# Patient Record
Sex: MALE | Race: BLACK OR AFRICAN AMERICAN | Employment: FULL TIME | URBAN - METROPOLITAN AREA
[De-identification: names, ages, dates, MRNs, and addresses within clinical notes are randomized per-mention and may not be internally consistent; named-entity substitution may affect disease eponyms.]

---

## 2017-02-13 ENCOUNTER — HOSPITAL ENCOUNTER (OUTPATIENT)
Dept: LAB | Age: 32
Discharge: HOME | End: 2017-02-13
Payer: MEDICARE

## 2017-02-13 ENCOUNTER — OFFICE VISIT (OUTPATIENT)
Dept: INTERNAL MEDICINE CLINIC | Age: 32
End: 2017-02-13

## 2017-02-13 VITALS
HEART RATE: 70 BPM | BODY MASS INDEX: 24.84 KG/M2 | TEMPERATURE: 97.3 F | DIASTOLIC BLOOD PRESSURE: 75 MMHG | RESPIRATION RATE: 16 BRPM | WEIGHT: 187.4 LBS | HEIGHT: 73 IN | SYSTOLIC BLOOD PRESSURE: 131 MMHG

## 2017-02-13 DIAGNOSIS — J45.909 UNCOMPLICATED ASTHMA, UNSPECIFIED ASTHMA SEVERITY: ICD-10-CM

## 2017-02-13 DIAGNOSIS — F90.9 ATTENTION DEFICIT HYPERACTIVITY DISORDER (ADHD), UNSPECIFIED ADHD TYPE: ICD-10-CM

## 2017-02-13 DIAGNOSIS — Z20.2 STD EXPOSURE: Primary | ICD-10-CM

## 2017-02-13 DIAGNOSIS — Z20.2 EXPOSURE TO CHLAMYDIA: ICD-10-CM

## 2017-02-13 DIAGNOSIS — A56.2 CHLAMYDIAL INFECTION OF GENITOURINARY TRACT: ICD-10-CM

## 2017-02-13 DIAGNOSIS — F32.1 MODERATE SINGLE CURRENT EPISODE OF MAJOR DEPRESSIVE DISORDER (HCC): ICD-10-CM

## 2017-02-13 DIAGNOSIS — F39 MOOD DISORDER (HCC): ICD-10-CM

## 2017-02-13 DIAGNOSIS — J30.9 ALLERGIC RHINITIS, UNSPECIFIED ALLERGIC RHINITIS TRIGGER, UNSPECIFIED RHINITIS SEASONALITY: ICD-10-CM

## 2017-02-13 DIAGNOSIS — Z11.3 SCREEN FOR STD (SEXUALLY TRANSMITTED DISEASE): ICD-10-CM

## 2017-02-13 DIAGNOSIS — F31.81 BIPOLAR 2 DISORDER (HCC): ICD-10-CM

## 2017-02-13 DIAGNOSIS — F41.9 ANXIETY: ICD-10-CM

## 2017-02-13 PROCEDURE — 87591 N.GONORRHOEAE DNA AMP PROB: CPT

## 2017-02-13 RX ORDER — QUETIAPINE FUMARATE 50 MG/1
50 TABLET, FILM COATED ORAL
Qty: 30 TAB | Refills: 2 | Status: SHIPPED | OUTPATIENT
Start: 2017-02-13 | End: 2017-02-15 | Stop reason: SDDI

## 2017-02-13 RX ORDER — BUPROPION HYDROCHLORIDE 300 MG/1
300 TABLET ORAL
Qty: 30 TAB | Refills: 2 | Status: SHIPPED | OUTPATIENT
Start: 2017-02-13 | End: 2017-02-15 | Stop reason: SDUPTHER

## 2017-02-13 RX ORDER — AZITHROMYCIN 500 MG/1
1000 TABLET, FILM COATED ORAL ONCE
Qty: 2 TAB | Refills: 0 | Status: SHIPPED | OUTPATIENT
Start: 2017-02-13 | End: 2017-02-13

## 2017-02-13 RX ORDER — DEXTROAMPHETAMINE SACCHARATE, AMPHETAMINE ASPARTATE MONOHYDRATE, DEXTROAMPHETAMINE SULFATE AND AMPHETAMINE SULFATE 7.5; 7.5; 7.5; 7.5 MG/1; MG/1; MG/1; MG/1
30 CAPSULE, EXTENDED RELEASE ORAL DAILY
Qty: 30 CAP | Refills: 0 | Status: SHIPPED | OUTPATIENT
Start: 2017-02-13 | End: 2017-02-15

## 2017-02-13 RX ORDER — DEXTROAMPHETAMINE SACCHARATE, AMPHETAMINE ASPARTATE MONOHYDRATE, DEXTROAMPHETAMINE SULFATE AND AMPHETAMINE SULFATE 7.5; 7.5; 7.5; 7.5 MG/1; MG/1; MG/1; MG/1
30 CAPSULE, EXTENDED RELEASE ORAL DAILY
Qty: 30 CAP | Refills: 0 | Status: SHIPPED | OUTPATIENT
Start: 2017-02-13 | End: 2017-02-15 | Stop reason: ALTCHOICE

## 2017-02-13 RX ORDER — DEXTROAMPHETAMINE SACCHARATE, AMPHETAMINE ASPARTATE MONOHYDRATE, DEXTROAMPHETAMINE SULFATE AND AMPHETAMINE SULFATE 7.5; 7.5; 7.5; 7.5 MG/1; MG/1; MG/1; MG/1
30 CAPSULE, EXTENDED RELEASE ORAL DAILY
Qty: 30 CAP | Refills: 0 | Status: SHIPPED | OUTPATIENT
Start: 2017-02-13 | End: 2017-02-15 | Stop reason: SDUPTHER

## 2017-02-13 RX ORDER — ALBUTEROL SULFATE 90 UG/1
2 AEROSOL, METERED RESPIRATORY (INHALATION)
Qty: 1 INHALER | Refills: 2 | Status: SHIPPED | OUTPATIENT
Start: 2017-02-13 | End: 2018-03-16 | Stop reason: SDUPTHER

## 2017-02-13 RX ORDER — BUSPIRONE HYDROCHLORIDE 5 MG/1
5 TABLET ORAL
Qty: 90 TAB | Refills: 2 | Status: SHIPPED | OUTPATIENT
Start: 2017-02-13 | End: 2017-02-15 | Stop reason: SDDI

## 2017-02-13 NOTE — PROGRESS NOTES
RM 14    Chief Complaint   Patient presents with    Behavioral Problem     ADHD , anxiety follow up       1. Have you been to the ER, urgent care clinic since your last visit? Hospitalized since your last visit? No    2. Have you seen or consulted any other health care providers outside of the [address] [place] [address] since your last visit? Include any pap smears or colon screening.  No    Health Maintenance Due   Topic Date Due    Pneumococcal 19-64 Medium Risk (1 of 1 - PPSV23) 04/03/2004    DTaP/Tdap/Td series (1 - Tdap) 04/03/2006    INFLUENZA AGE 9 TO ADULT  08/01/2016     Pt did not get flu vaccine this season   How to stop smoking information sent to pt AVS

## 2017-02-13 NOTE — MR AVS SNAPSHOT
Visit Information Date & Time Provider Department Dept. Phone Encounter #  
 2/13/2017  9:45 AM Kenan Vidales MD [facility] [phone] [id number] Follow-up Instructions Return in about 4 months (around 6/13/2017), or if symptoms worsen or fail to improve, for ADHD, asthma. Upcoming Health Maintenance Date Due DTaP/Tdap/Td series (2 - Td) 2/13/2027 Allergies as of 2/13/2017  Review Complete On: 2/13/2017 By: Kenan Vidales MD  
 No Known Allergies Current Immunizations  Reviewed on 2/13/2017 No immunizations on file. Reviewed by Kenan Vidales MD on 2/13/2017 at 10:48 AM  
 Reviewed by Kenan Vidales MD on 2/13/2017 at 10:48 AM  
You Were Diagnosed With   
  
 Codes Comments STD exposure    -  Primary ICD-10-CM: Z20.2 ICD-9-CM: V01.6 Exposure to chlamydia     ICD-10-CM: Z20.2 ICD-9-CM: V01.6 Chlamydial infection of genitourinary tract     ICD-10-CM: A56.2 ICD-9-CM: 099.55, 079.98 Screen for STD (sexually transmitted disease)     ICD-10-CM: Z11.3 ICD-9-CM: V74.5 Bipolar 2 disorder (HCC)     ICD-10-CM: F31.81 
ICD-9-CM: 296.89 Uncomplicated asthma, unspecified asthma severity     ICD-10-CM: J45.909 ICD-9-CM: 493.90 Allergic rhinitis, unspecified allergic rhinitis trigger, unspecified rhinitis seasonality     ICD-10-CM: J30.9 ICD-9-CM: 477.9 Attention deficit hyperactivity disorder (ADHD), unspecified ADHD type     ICD-10-CM: F90.9 ICD-9-CM: 314.01 Mood disorder ([address]     ICD-10-CM: F39 
ICD-9-CM: 296.90 Moderate single current episode of major depressive disorder (HCC)     ICD-10-CM: F32.1 ICD-9-CM: 296.22 Anxiety     ICD-10-CM: F41.9 ICD-9-CM: 300.00 Vitals BP Pulse Temp Resp Height(growth percentile) Weight(growth percentile) 131/75 70 97.3 °F (36.3 °C) (Oral) 16 6' 1\" (1.854 m) 187 lb 6.4 oz (85 kg) BMI Smoking Status 24.72 kg/m2 Current Every Day Smoker BMI and BSA Data Body Mass Index Body Surface Area 24.72 kg/m 2 2.09 m 2 Preferred Pharmacy Pharmacy Name Phone [id number] [phone] Your Updated Medication List  
  
   
This list is accurate as of: 2/13/17 10:51 AM.  Always use your most recent med list.  
  
  
  
  
 albuterol 90 mcg/actuation inhaler Commonly known as:  PROVENTIL HFA, VENTOLIN HFA, PROAIR HFA Take 2 Puffs by inhalation every four (4) hours as needed for Wheezing. Use with spacer. * amphetamine-dextroamphetamine XR 30 mg XR capsule Commonly known as:  ADDERALL XR Take 1 Cap (30 mg total) by mouth daily. Max Daily Amount: 30 mg  
  
 * amphetamine-dextroamphetamine XR 30 mg XR capsule Commonly known as:  ADDERALL XR Take 1 Cap (30 mg total) by mouth daily. Fill on or after 4/13/17 Max Daily Amount: 30 mg  
  
 * amphetamine-dextroamphetamine XR 30 mg XR capsule Commonly known as:  ADDERALL XR Take 1 Cap (30 mg total) by mouth daily. Fill on or after 3/13/17 Max Daily Amount: 30 mg  
  
 azithromycin 500 mg Tab Commonly known as:  Carleen Bunkers Take 2 Tabs by mouth once for 1 dose. beclomethasone 80 mcg/actuation inhaler Commonly known as:  QVAR Take 2 Puffs by inhalation daily. buPROPion  mg XL tablet Commonly known as:  Sauceda  Take 1 Tab by mouth every morning. busPIRone 5 mg tablet Commonly known as:  BUSPAR Take 1 Tab by mouth three (3) times daily (with meals). hydrOXYzine HCl 25 mg tablet Commonly known as:  ATARAX Take 1 Tab by mouth three (3) times daily as needed for Anxiety. QUEtiapine 50 mg tablet Commonly known as:  SEROquel Take 1 Tab by mouth nightly. * Notice:   This list has 3 medication(s) that are the same as other medications prescribed for you. Read the directions carefully, and ask your doctor or other care provider to review them with you. Prescriptions Printed Refills  
 amphetamine-dextroamphetamine XR (ADDERALL XR) 30 mg XR capsule 0 Sig: Take 1 Cap (30 mg total) by mouth daily. Max Daily Amount: 30 mg  
 Class: Print Route: Oral  
 amphetamine-dextroamphetamine XR (ADDERALL XR) 30 mg XR capsule 0 Sig: Take 1 Cap (30 mg total) by mouth daily. Fill on or after 4/13/17 Max Daily Amount: 30 mg  
 Class: Print Route: Oral  
 amphetamine-dextroamphetamine XR (ADDERALL XR) 30 mg XR capsule 0 Sig: Take 1 Cap (30 mg total) by mouth daily. Fill on or after 3/13/17 Max Daily Amount: 30 mg  
 Class: Print Route: Oral  
  
Prescriptions Sent to Pharmacy Refills buPROPion XL (WELLBUTRIN XL) 300 mg XL tablet 2 Sig: Take 1 Tab by mouth every morning. Class: Normal  
 Pharmacy: [address] Ph #: [phone] Route: Oral  
 busPIRone (BUSPAR) 5 mg tablet 2 Sig: Take 1 Tab by mouth three (3) times daily (with meals). Class: Normal  
 Pharmacy: [address] Ph #: [phone] Route: Oral  
 QUEtiapine (SEROQUEL) 50 mg tablet 2 Sig: Take 1 Tab by mouth nightly. Class: Normal  
 Pharmacy: [address] Ph #: [phone] Route: Oral  
 azithromycin (ZITHROMAX) 500 mg tab 0 Sig: Take 2 Tabs by mouth once for 1 dose. Class: Normal  
 Pharmacy: [address] Ph #: [phone]  Route: Oral  
 albuterol (PROVENTIL HFA, VENTOLIN HFA, PROAIR HFA) 90 mcg/actuation inhaler 2  
 Sig: Take 2 Puffs by inhalation every four (4) hours as needed for Wheezing. Use with spacer. Class: Normal  
 Pharmacy: [facility]/pharmacy [facility], [address] [place] [address] Ph #: [phone] Route: Inhalation  
 beclomethasone (QVAR) 80 mcg/actuation inhaler 5 Sig: Take 2 Puffs by inhalation daily. Class: Normal  
 Pharmacy: [facility]/pharmacy [facility] [address], [address] [place] [address] Ph #: [phone] Route: Inhalation We Performed the Following CBC WITH AUTOMATED DIFF [75660 CPT(R)] CT/NG/T.VAGINALIS AMPLIFICATION [id number] CPT(R)] HCV AB W/RFLX TO MARISSA [63837 CPT(R)] HEP B SURFACE AG [id number] CPT(R)] HIV 1/2 AG/AB, 4TH GENERATION,W RFLX CONFIRM [[id number] Custom] METABOLIC PANEL, COMPREHENSIVE [26344 CPT(R)] T PALLIDUM AB [[id number] Custom] Follow-up Instructions Return in about 4 months (around 6/13/2017), or if symptoms worsen or fail to improve, for ADHD, asthma. Patient Instructions Stopping Smoking: Care Instructions Your Care Instructions Cigarette smokers crave the nicotine in cigarettes. Giving it up is much harder than simply changing a habit. Your body has to stop craving the nicotine. It is hard to quit, but you can do it. There are many tools that people use to quit smoking. You may find that combining tools works best for you. There are several steps to quitting. First you get ready to quit. Then you get support to help you. After that, you learn new skills and behaviors to become a nonsmoker. For many people, a necessary step is getting and using medicine. Your doctor will help you set up the plan that best meets your needs. You may want to attend a smoking cessation program to help you quit smoking. When you choose a program, look for one that has proven success. Ask your doctor for ideas.  You will greatly increase your chances of success if you take medicine as well as get counseling or join a cessation program. 
Some of the changes you feel when you first quit tobacco are uncomfortable. Your body will miss the nicotine at first, and you may feel short-tempered and grumpy. You may have trouble sleeping or concentrating. Medicine can help you deal with these symptoms. You may struggle with changing your smoking habits and rituals. The last step is the tricky one: Be prepared for the smoking urge to continue for a time. This is a lot to deal with, but keep at it. You will feel better. Follow-up care is a key part of your treatment and safety. Be sure to make and go to all appointments, and call your doctor if you are having problems. Its also a good idea to know your test results and keep a list of the medicines you take. How can you care for yourself at home? · Ask your family, friends, and coworkers for support. You have a better chance of quitting if you have help and support. · Join a support group, such as Nicotine Anonymous, for people who are trying to quit smoking. · Consider signing up for a smoking cessation program, such as the American Lung Association's Freedom from Smoking program. 
· Set a quit date. Pick your date carefully so that it is not right in the middle of a big deadline or stressful time. Once you quit, do not even take a puff. Get rid of all ashtrays and lighters after your last cigarette. Clean your house and your clothes so that they do not smell of smoke. · Learn how to be a nonsmoker. Think about ways you can avoid those things that make you reach for a cigarette. ¨ Avoid situations that put you at greatest risk for smoking. For some people, it is hard to have a drink with friends without smoking. For others, they might skip a coffee break with coworkers who smoke. ¨ Change your daily routine. Take a different route to work or eat a meal in a different place. · Cut down on stress. Calm yourself or release tension by doing an activity you enjoy, such as reading a book, taking a hot bath, or gardening. · Talk to your doctor or pharmacist about nicotine replacement therapy, which replaces the nicotine in your body. You still get nicotine but you do not use tobacco. Nicotine replacement products help you slowly reduce the amount of nicotine you need. These products come in several forms, many of them available over-the-counter: ¨ Nicotine patches ¨ Nicotine gum and lozenges ¨ Nicotine inhaler · Ask your doctor about bupropion (Wellbutrin) or varenicline (Chantix), which are prescription medicines. They do not contain nicotine. They help you by reducing withdrawal symptoms, such as stress and anxiety. · Some people find hypnosis, acupuncture, and massage helpful for ending the smoking habit. · Eat a healthy diet and get regular exercise. Having healthy habits will help your body move past its craving for nicotine. · Be prepared to keep trying. Most people are not successful the first few times they try to quit. Do not get mad at yourself if you smoke again. Make a list of things you learned and think about when you want to try again, such as next week, next month, or next year. Where can you learn more? Go to http://mazin-kenyatta.info/. Enter V444 in the search box to learn more about \"Stopping Smoking: Care Instructions. \" Current as of: May 26, 2016 Content Version: 11.1 © 1329-4852 [employer]wise[employer]. Care instructions adapted under license by [employer] (which disclaims liability or warranty for this information). If you have questions about a medical condition or this instruction, always ask your healthcare professional. [address] any warranty or liability for your use of this information. Introducing [facility]!    
 [facility] introduces [employer] patient portal. Now you can access parts of your medical record, email your doctor's office, and request medication refills online. 1. In your internet browser, go to [email or web address] 2. Click on the First Time User? Click Here link in the Sign In box. You will see the New Member Sign Up page. 3. Enter your [employer] Access Code exactly as it appears below. You will not need to use this code after youve completed the sign-up process. If you do not sign up before the expiration date, you must request a new code. · [employer] Access Code: [email or web address] Expires: 5/14/2017  9:22 AM 
 
4. Enter the last four digits of your Social Security Number (xxxx) and Date of Birth (mm/dd/yyyy) as indicated and click Submit. You will be taken to the next sign-up page. 5. Create a Dist[employer]ify ID. This will be your [employer] login ID and cannot be changed, so think of one that is secure and easy to remember. 6. Create a [employer] password. You can change your password at any time. 7. Enter your Password Reset Question and Answer. This can be used at a later time if you forget your password. 8. Enter your e-mail address. You will receive e-mail notification when new information is available in [address] 9. Click Sign Up. You can now view and download portions of your medical record. 10. Click the Download Summary menu link to download a portable copy of your medical information. If you have questions, please visit the Frequently Asked Questions section of the [employer] website. Remember, [employer] is NOT to be used for urgent needs. For medical emergencies, dial 911. Now available from your iPhone and Android! Please provide this summary of care documentation to your next provider. Your primary care clinician is listed as [address] If you have any questions after today's visit, please call [phone].

## 2017-02-13 NOTE — PATIENT INSTRUCTIONS

## 2017-02-13 NOTE — PROGRESS NOTES
HISTORY OF PRESENT ILLNESS  Dona Nguyen is a 32 y.o. male. HPI  Presents for f/u anxiety, bipolar d/o, ADHD, etc.    Pt told by sexual partner that she had chlamydia    Pt asx    No other known dz exposures    Pt reports meds were not adequate to control anger, irritability  Needs refills  Pt reports dx bipolar d/o    Pt reports some +asthma sx  Not using Qvar  Has run out of albuterol    Past medical, Social, and Family history reviewed  Medications reviewed and updated. ROS  Complete ROS reviewed and negative or stable except as noted in HPI. Physical Exam   Constitutional: He is oriented to person, place, and time. He appears well-nourished. No distress. HENT:   Head: Normocephalic and atraumatic. Mouth/Throat: Oropharynx is clear and moist.   Eyes: EOM are normal. Pupils are equal, round, and reactive to light. No scleral icterus. Neck: Normal range of motion. Neck supple. Cardiovascular: Normal rate, regular rhythm and normal heart sounds. Exam reveals no gallop and no friction rub. No murmur heard. Pulmonary/Chest: Effort normal and breath sounds normal. No respiratory distress. He has no wheezes. He has no rales. Abdominal: Soft. He exhibits no distension. There is no tenderness. Genitourinary:   Genitourinary Comments: Deferred   Musculoskeletal: Normal range of motion. He exhibits no edema. Neurological: He is alert and oriented to person, place, and time. He exhibits normal muscle tone. Skin: Skin is warm. No rash noted. Psychiatric: He has a normal mood and affect. Nursing note and vitals reviewed. Prior labs reviewed. ASSESSMENT and PLAN    ICD-10-CM ICD-9-CM    1. STD exposure Z20.2 V01.6    2. Exposure to chlamydia Z20.2 V01.6 CT/NG/T.VAGINALIS AMPLIFICATION      azithromycin (ZITHROMAX) 500 mg tab   3. Chlamydial infection of genitourinary tract  A56.2 099.55 CT/NG/T.VAGINALIS AMPLIFICATION     079.98 azithromycin (ZITHROMAX) 500 mg tab   4.  Screen for STD (sexually transmitted disease) Z11.3 V74.5 CBC WITH AUTOMATED DIFF      METABOLIC PANEL, COMPREHENSIVE      HCV AB W/RFLX TO MARISSA      HIV 1/2 AG/AB, 4TH GENERATION,W RFLX CONFIRM      HEP B SURFACE AG      T PALLIDUM AB   5. Bipolar 2 disorder ([facility]) F31.81 296.89    6. Uncomplicated asthma, unspecified asthma severity J45.909 493.90 albuterol (PROVENTIL HFA, VENTOLIN HFA, PROAIR HFA) 90 mcg/actuation inhaler      beclomethasone (QVAR) 80 mcg/actuation inhaler   7. Allergic rhinitis, unspecified allergic rhinitis trigger, unspecified rhinitis seasonality J30.9 477.9    8. Attention deficit hyperactivity disorder (ADHD), unspecified ADHD type F90.9 314.01 DISCONTINUED: amphetamine-dextroamphetamine XR (ADDERALL XR) 30 mg XR capsule      DISCONTINUED: amphetamine-dextroamphetamine XR (ADDERALL XR) 30 mg XR capsule      DISCONTINUED: amphetamine-dextroamphetamine XR (ADDERALL XR) 30 mg XR capsule   9. Mood disorder ([facility]) F39 296.90 DISCONTINUED: buPROPion XL (WELLBUTRIN XL) 300 mg XL tablet      DISCONTINUED: busPIRone (BUSPAR) 5 mg tablet      DISCONTINUED: QUEtiapine (SEROQUEL) 50 mg tablet   10. Moderate single current episode of major depressive disorder ([facility]) F32.1 296.22 DISCONTINUED: buPROPion XL (WELLBUTRIN XL) 300 mg XL tablet      DISCONTINUED: busPIRone (BUSPAR) 5 mg tablet      DISCONTINUED: QUEtiapine (SEROQUEL) 50 mg tablet   11. Anxiety F41.9 300.00 DISCONTINUED: busPIRone (BUSPAR) 5 mg tablet     Follow-up Disposition:  Return in about 4 months (around 6/13/2017), or if symptoms worsen or fail to improve, for ADHD, asthma.   results and schedule of future studies reviewed with patient  reviewed diet, exercise and weight    cardiovascular risk and specific lipid/LDL goals reviewed  reviewed medications and side effects in detail   Agree to refill meds  Increase wellbutrin  May need other mood stabilizing agent  Encouraged to f/u with psych  Check urine for STD  Empiric azithro  Labs at lab for additional STD screenings  Resume Qvar  Refill albuterol

## 2017-02-14 ENCOUNTER — TELEPHONE (OUTPATIENT)
Dept: BEHAVIORAL/MENTAL HEALTH CLINIC | Age: 32
End: 2017-02-14

## 2017-02-15 ENCOUNTER — TELEPHONE (OUTPATIENT)
Dept: BEHAVIORAL/MENTAL HEALTH CLINIC | Age: 32
End: 2017-02-15

## 2017-02-15 ENCOUNTER — OFFICE VISIT (OUTPATIENT)
Dept: BEHAVIORAL/MENTAL HEALTH CLINIC | Age: 32
End: 2017-02-15

## 2017-02-15 VITALS
HEART RATE: 79 BPM | OXYGEN SATURATION: 97 % | BODY MASS INDEX: 23.33 KG/M2 | DIASTOLIC BLOOD PRESSURE: 79 MMHG | WEIGHT: 176 LBS | SYSTOLIC BLOOD PRESSURE: 128 MMHG | HEIGHT: 73 IN

## 2017-02-15 DIAGNOSIS — Z78.9 CAFFEINE USE: ICD-10-CM

## 2017-02-15 DIAGNOSIS — F17.200 NICOTINE USE DISORDER: ICD-10-CM

## 2017-02-15 DIAGNOSIS — F41.9 ANXIETY: ICD-10-CM

## 2017-02-15 DIAGNOSIS — F39 MOOD DISORDER (HCC): Primary | ICD-10-CM

## 2017-02-15 DIAGNOSIS — F32.1 MODERATE SINGLE CURRENT EPISODE OF MAJOR DEPRESSIVE DISORDER (HCC): ICD-10-CM

## 2017-02-15 RX ORDER — HYDROXYZINE 25 MG/1
25 TABLET, FILM COATED ORAL
Qty: 120 TAB | Refills: 0 | Status: SHIPPED | OUTPATIENT
Start: 2017-02-15 | End: 2017-03-06 | Stop reason: SDUPTHER

## 2017-02-15 RX ORDER — BUPROPION HYDROCHLORIDE 150 MG/1
150 TABLET ORAL
Qty: 30 TAB | Refills: 0 | Status: SHIPPED | OUTPATIENT
Start: 2017-02-15 | End: 2017-03-06 | Stop reason: SDUPTHER

## 2017-02-15 RX ORDER — DIVALPROEX SODIUM 250 MG/1
250 TABLET, DELAYED RELEASE ORAL 2 TIMES DAILY
Qty: 60 TAB | Refills: 0 | Status: SHIPPED | OUTPATIENT
Start: 2017-02-15 | End: 2017-03-06 | Stop reason: SDUPTHER

## 2017-02-15 NOTE — PROGRESS NOTES
Psychiatric Outpatient Progress Note    Account Number:  [id number]  Name: Franky Leija    SUBJECTIVE:   CHIEF COMPLAINT:  Franky Leija is a 32 y.o. male and was seen today for follow-up of psychiatric condition and psychotropic medication management. He was seen with his counselor, Mr Parkerama Joseph. HPI:    Norvin Phalen reports the following psychiatric symptoms:  depression and anxiety. The symptoms have been present for few years and are of moderate to high severity. The symptoms occur daily. Reported sleeping for 2-4 hrs and reported difficulty initiating and maintaining sleep . Taking \"cat naps\" of 2 hrs in a day. Client reported energy level is low, difficulty focus and concentrate, easily distracted, racing thoughts, loabile mood, easily gets agitated, impulsivity,  and gets overwhelmed. Feels hopeless adn helpless, decreased motivation. Reported was recently in skilled nursing for 7 days for damaging property. Client reported increased energy and decreased need for sleep, starts new projects and not able to finish it, increased spending and has created debt. Contributing factors include:just released from skilled nursing, 13 driving offence, 2 felony , abduction and conspiracy charge- did 2 yrs of skilled nursing. Recent break up with girl friend, Edward davis- 14 times. Financial , unemployed, unable to find job. Patient denies SI/HI/SIB. Side Effects:  none      Fam/Soc Hx (from [facility] with updates):    Family History   Problem Relation Age of Onset    Asthma Mother       Social History   Substance Use Topics    Smoking status: Current Every Day Smoker     Packs/day: 1.50     Types: Cigars    Smokeless tobacco: Never Used    Alcohol use 0.0 oz/week     0 Standard drinks or equivalent per week      Comment: socially       REVIEW OF SYSTEMS:  Psychiatric:  depression, anxiety.   Appetite:no change from normal   Sleep: poor with DIMS (difficulty initiating & maintaining sleep)                    Mental Status exam: WNL except for Sensorium  oriented to time, place and person   Relations cooperative    Eye Contact    appropriate   Appearance:  age appropriate, casually dressed, piercings and tattooed   Motor Behavior/Gait:  gait stable and within normal limits   Speech:  normal pitch and normal volume   Thought Process: goal directed, logical and within normal limits   Thought Content free of delusions and free of hallucinations   Suicidal ideations no plan , no intention and none   Homicidal ideations no plan , no intention and none   Mood:  angry, anxious, irritable and labile   Affect:  anxious, depressed, irritable, labile and mood-congruent   Memory recent  adequate   Memory remote:  adequate   Concentration:  adequate   Abstraction:  abstract   Insight:  limited   Reliability fair   Judgment:  limited       MEDICAL DECISION MAKING  Data: pertinent labs, imaging, medical records and diagnostic tests reviewed and incorporated in diagnosis and treatment plan    No Known Allergies     Current Outpatient Prescriptions   Medication Sig Dispense Refill    hydrOXYzine HCl (ATARAX) 25 mg tablet Take 1 Tab by mouth every six (6) hours as needed for Anxiety. 120 Tab 0    buPROPion XL (WELLBUTRIN XL) 150 mg tablet Take 1 Tab by mouth every morning. 30 Tab 0    divalproex DR (DEPAKOTE) 250 mg tablet Take 1 Tab by mouth two (2) times a day. 60 Tab 0    albuterol (PROVENTIL HFA, VENTOLIN HFA, PROAIR HFA) 90 mcg/actuation inhaler Take 2 Puffs by inhalation every four (4) hours as needed for Wheezing. Use with spacer. 1 Inhaler 2    beclomethasone (QVAR) 80 mcg/actuation inhaler Take 2 Puffs by inhalation daily.  8.7 g 5        Visit Vitals    /79 (BP 1 Location: Left arm, BP Patient Position: Sitting)    Pulse 79    Ht 6' 1\" (1.854 m)    Wt 79.8 kg (176 lb)    SpO2 97%    BMI 23.22 kg/m2         Problems addressed today:  Mood disorder nos, adjustment disorder with anxiety and depression, caffeine use r/o Bipolar disorder, Insomnia, alcohol use, caffeine use , nicotine use. Assessment:  reviewed; received xanax and Adderall from Dr Dario Damian on 09/13/16. Kristine Mtz  is a 32 y.o. afro - [place]  male  is not responding to treatment. Client missed his last appointment, had initial evaluation on 11/08/16. H/o learning disability and was in special school. Didnot receive any treatment for ADHD in middle and high school. Received Adderall since 2013 and currently taking as prn. Client reported that he saw his PCP Dr Lisa Padilla last month and asked for Adderall and has not filled it yet  Client is currently not working and nor in school so at this time Adderall is not recommended. Client was non compliant with his medication regimen and has stopped taking his psychotropics since 2 months. Client reported irritability, increased anxiety, racing thoughts, mood swings, impulsivity, anger issues, easily distracted, has difficulty focus and concentration, sleeping for 4-5 hrs, feels tired, increased anxiety and restless. Reported agitation and charged with destruction of property was in senior living for a week since last visit. Heavy  caffeine use. Has H/o manic episodes with decreased need for sleep, increased energy and spending sprees. H/o multiple legal charges and senior living 14 times. Plan to begin depakote  and restart bupropion to stabilize mood. Risk Scoring- chronic illnesses and prescription drug management   Patient denies SI/HI/SIB. No evidence of AH/VH or delusions. Treatment Plan:  1. Medications:          Medication Changes/Adjustments:Begin Depakote 250 mg BID                                                               Begin Hydroxyzine 25 mg QID                                                              Restart Bupropion 150 mg XL- please take in morning     Current Outpatient Prescriptions   Medication Sig Dispense Refill    hydrOXYzine HCl (ATARAX) 25 mg tablet Take 1 Tab by mouth every six (6) hours as needed for Anxiety. 120 Tab 0    buPROPion XL (WELLBUTRIN XL) 150 mg tablet Take 1 Tab by mouth every morning. 30 Tab 0    divalproex DR (DEPAKOTE) 250 mg tablet Take 1 Tab by mouth two (2) times a day. 60 Tab 0    albuterol (PROVENTIL HFA, VENTOLIN HFA, PROAIR HFA) 90 mcg/actuation inhaler Take 2 Puffs by inhalation every four (4) hours as needed for Wheezing. Use with spacer. 1 Inhaler 2    beclomethasone (QVAR) 80 mcg/actuation inhaler Take 2 Puffs by inhalation daily. 8.7 g 5                  The following regarding medications was addressed:    (The risks and benefits of the proposed medication; the potential medication side effects ie    dry mouth, weight gain, GI upset, headache; patient given opportunity to ask questions)Pt was advised that studies have shown that use of Benzodiazepines over a  a long period of  time could predispose to dementia & memory loss . Pt voiced understanding & stated he understood that if he continues to use them he could be predisposed to above mentioned comorbidities. Use caution when performing tasks which require mental alertness (eg, operating machinery or driving). Benzodiazepines due to their potential for tolerance as well as psychological addiction in some people,  generally for those with current substance abuse. Please go to ER for any signs of seizure activity or withdrawal symptoms of shakiness, confusion, disorientation       2. Counseling and coordination of care including instructions for treatment, risks/benefits, risk factor reduction and patient/family education. He agrees with the plan. Patient instructed to call with any side effects, questions or issues. Instructed patient to call the clinic, and if after hours call the provider on call ifclient experiences any suicidal thought or ideas to hurt self or other. Also instructed to call 911 or go to the ED. Patient verbalized understanding and agreed to call    3.   Follow-up Disposition:  Return in about 3 weeks (around 3/8/2017) for med check and follow up. 4. Other: Nutritional/health counseling on diet and exercise. For reliable dietary information, go to www. [employer]AT[employer].org.    Psychoeducation provided  Treatment plan reviewed with patient-including diagnosis and medications    Worked on issues of denial & effects of substance dependency/use- caffeine and alcohol use. Dutch Chun is not progressing.     Rayleen Kussmaul, NP  2/15/2017

## 2017-02-15 NOTE — PATIENT INSTRUCTIONS
Sleep Tips    What to avoid    · Do not have drinks with caffeine, such as coffee or black tea, for 8 hours before bed. · Do not smoke or use other types of tobacco near bedtime. Nicotine is a stimulant and can keep you awake. · Avoid drinking alcohol late in the evening, because it can cause you to wake in the middle of the night. · Do not eat a big meal close to bedtime. If you are hungry, eat a light snack. · Do not drink a lot of water close to bedtime, because the need to urinate may wake you up during the night. · Do not read or watch TV in bed. Use the bed only for sleeping and sexual activity. What to try    · Go to bed at the same time every night, and wake up at the same time every morning. Do not take naps during the day. · Keep your bedroom quiet, dark, and cool. · Get regular exercise, but not within 3 to 4 hours of your bedtime. · Sleep on a comfortable pillow and mattress. · If watching the clock makes you anxious, turn it facing away from you so you cannot see the time. · If you worry when you lie down, start a worry book. Well before bedtime, write down your worries, and then set the book and your concerns aside. · Try meditation or other relaxation techniques before you go to bed. · If you cannot fall asleep, get up and go to another room until you feel sleepy. Do something relaxing. Repeat your bedtime routine before you go to bed again. Make your house quiet and calm about an hour before bedtime. Turn down the lights, turn off the TV, log off the computer, and turn down the volume on music. This can help you relax after a busy day. For reliable dietary information, go to www. [employer].org.      Learning About Mood Disorders  What are mood disorders? Mood disorders are medical problems that affect how you feel. They can impact your moods, thoughts, and actions. Mood disorders include:  Depression. This causes you to feel sad and hopeless for much of the time. Bipolar disorder.  This causes extreme mood changes from manic episodes of very high energy to extreme lows of depression. Seasonal affective disorder (SAD). This is a type of depression that affects you during the same season each year. Most often people experience SAD during the fall and winter months when days are shorter and there is less light. What are the symptoms? Depression  You may:  Feel sad or hopeless nearly every day. Lose interest in or not get pleasure from most daily activities. You feel this way nearly every day. Have low energy, changes in your appetite, or changes in how well you sleep. Have trouble concentrating. Think about death and suicide. Keep the numbers for these national suicide hotlines: 9-966-709-TALK (5-828.235.2387) and 8-899-JJWZVIV (2-706.608.8771). If you or someone you know talks about suicide or feeling hopeless, get help right away. Bipolar disorder  Symptoms depend on your mood swings. You may:  Feel very happy, energetic, or on edge. Feel like you need very little sleep. Feel overly self-confident. Do impulsive things, such as spending a lot of money. Feel sad or hopeless. Have racing thoughts or trouble thinking and making decisions. Lose interest in things you have enjoyed in the past.  Think about death and suicide. Keep the numbers for these national suicide hotlines: 9-898-434-TALK (7-640.472.3978) and 4-433-RCTKYIX (9-392.217.8951). If you or someone you know talks about suicide or feeling hopeless, get help right away. Seasonal affective disorder (SAD)  Symptoms come and go at about the same time each year. For most people with SAD, symptoms come during the winter when there is less daylight. You may:  Feel sad, grumpy, carter, or anxious. Lose interest in your usual activities. Eat more and crave carbohydrates, such as bread and pasta. Gain weight. Sleep more and feel drowsy during the daytime. How are mood disorders treated?   Mood disorders can be treated with medicines or counseling, or a combination of both. Medicines for depression and SAD may include antidepressants. Medicines for bipolar disorder may include:  Mood stabilizers. Antipsychotics. Benzodiazepines. Counseling may involve cognitive-behavioral therapy. It teaches you how to change the ways you think and behave. This can help you stop thinking bad thoughts about yourself and your life. Light therapy is the main treatment for SAD. This therapy uses a special kind of lamp. You let the lamp shine on you at certain times, usually in the morning. This may help your symptoms during the months when there is less sunlight. Healthy lifestyle  Healthy lifestyle changes may help you feel better. Get at least 30 minutes of exercise on most days of the week. Walking is a good choice. Eat a healthy diet. Include fruits, vegetables, lean proteins, and whole grains in your diet each day. Keep a regular sleep schedule. Try for 8 hours of sleep a night. Find ways to manage stress, such as relaxation exercises. Avoid alcohol and illegal drugs. Follow-up care is a key part of your treatment and safety. Be sure to make and go to all appointments, and call your doctor if you are having problems. It's also a good idea to know your test results and keep a list of the medicines you take. Where can you learn more? Go to http://mazin-kenyatta.info/. Enter L762 in the search box to learn more about \"Learning About Mood Disorders. \"  Current as of: July 26, 2016  Content Version: 11.1  © 7860-4420 [employer]. Care instructions adapted under license by [employer] (which disclaims liability or warranty for this information). If you have questions about a medical condition or this instruction, always ask your healthcare professional. [place] [address] any warranty or liability for your use of this information.   ·

## 2017-02-15 NOTE — MR AVS SNAPSHOT
Visit Information Date & Time Provider Department Dept. Phone Encounter #  
 2/15/2017  2:00 PM [facility] [phone] [id number] Follow-up Instructions Return in about 3 weeks (around 3/8/2017) for med check and follow up. Your Appointments 3/6/2017 11:00 AM  
ESTABLISHED PATIENT with [facility] ([address]) Appt Note: 3 week follow-up [address] [facility] [address] [facility]  
  
   
 [address] [facility] [address] [address] Upcoming Health Maintenance Date Due DTaP/Tdap/Td series (2 - Td) 2/13/2027 Allergies as of 2/15/2017  Review Complete On: 2/15/2017 By: Wanda Bacon CNA No Known Allergies Current Immunizations  Reviewed on 2/13/2017 No immunizations on file. Not reviewed this visit You Were Diagnosed With   
  
 Codes Comments Mood disorder ([facility] [address]    -  Primary ICD-10-CM: F39 
ICD-9-CM: 296.90 Moderate single current episode of major depressive disorder (HCC)     ICD-10-CM: F32.1 ICD-9-CM: 296.22 Anxiety     ICD-10-CM: F41.9 ICD-9-CM: 300.00 Nicotine use disorder     ICD-10-CM: F17.200 ICD-9-CM: 305.1 Caffeine use     ICD-10-CM: F15.90 ICD-9-CM: V49.89 Vitals BP Pulse Height(growth percentile) Weight(growth percentile) SpO2 BMI  
 128/79 (BP 1 Location: Left arm, BP Patient Position: Sitting) 79 6' 1\" (1.854 m) 176 lb (79.8 kg) 97% 23.22 kg/m2 Smoking Status Current Every Day Smoker Vitals History BMI and BSA Data Body Mass Index Body Surface Area  
 23.22 kg/m 2 2.03 m 2 Preferred Pharmacy Pharmacy Name Phone [facility]Y [id number]- [facility] Jareth Camacho 23 [phone] Your Updated Medication List  
  
   
This list is accurate as of: 2/15/17  2:59 PM.  Always use your most recent med list.  
  
  
  
  
 albuterol 90 mcg/actuation inhaler Commonly known as:  PROVENTIL HFA, VENTOLIN HFA, PROAIR HFA Take 2 Puffs by inhalation every four (4) hours as needed for Wheezing. Use with spacer. beclomethasone 80 mcg/actuation inhaler Commonly known as:  QVAR Take 2 Puffs by inhalation daily. buPROPion  mg tablet Commonly known as:  Jamas Messenger Take 1 Tab by mouth every morning. divalproex  mg tablet Commonly known as:  DEPAKOTE Take 1 Tab by mouth two (2) times a day.  
  
 hydrOXYzine HCl 25 mg tablet Commonly known as:  ATARAX Take 1 Tab by mouth every six (6) hours as needed for Anxiety. Prescriptions Sent to Pharmacy Refills  
 hydrOXYzine HCl (ATARAX) 25 mg tablet 0 Sig: Take 1 Tab by mouth every six (6) hours as needed for Anxiety. Class: Normal  
 Pharmacy: [facility]/pharmacy [place] [address] Ph #: [phone] Route: Oral  
 buPROPion XL (WELLBUTRIN XL) 150 mg tablet 0 Sig: Take 1 Tab by mouth every morning. Class: Normal  
 Pharmacy: [facility]/pharmacy [place] [address] Ph #: [phone] Route: Oral  
 divalproex DR (DEPAKOTE) 250 mg tablet 0 Sig: Take 1 Tab by mouth two (2) times a day. Class: Normal  
 Pharmacy: [facility]/pharmacy [place] [address] Ph #: [phone] Route: Oral  
  
Follow-up Instructions Return in about 3 weeks (around 3/8/2017) for med check and follow up. Patient Instructions Sleep Tips What to avoid   
· Do not have drinks with caffeine, such as coffee or black tea, for 8 hours before bed. · Do not smoke or use other types of tobacco near bedtime. Nicotine is a stimulant and can keep you awake.  
· Avoid drinking alcohol late in the evening, because it can cause you to wake in the middle of the night. · Do not eat a big meal close to bedtime. If you are hungry, eat a light snack. · Do not drink a lot of water close to bedtime, because the need to urinate may wake you up during the night. · Do not read or watch TV in bed. Use the bed only for sleeping and sexual activity. What to try   
· Go to bed at the same time every night, and wake up at the same time every morning. Do not take naps during the day. · Keep your bedroom quiet, dark, and cool. · Get regular exercise, but not within 3 to 4 hours of your bedtime. · Sleep on a comfortable pillow and mattress. · If watching the clock makes you anxious, turn it facing away from you so you cannot see the time. · If you worry when you lie down, start a worry book. Well before bedtime, write down your worries, and then set the book and your concerns aside. · Try meditation or other relaxation techniques before you go to bed. · If you cannot fall asleep, get up and go to another room until you feel sleepy. Do something relaxing. Repeat your bedtime routine before you go to bed again. Make your house quiet and calm about an hour before bedtime. Turn down the lights, turn off the TV, log off the computer, and turn down the volume on music. This can help you relax after a busy day. For reliable dietary information, go to www. EATRIGHT.org.  
  
Learning About Mood Disorders What are mood disorders? Mood disorders are medical problems that affect how you feel. They can impact your moods, thoughts, and actions. Mood disorders include: 
Depression. This causes you to feel sad and hopeless for much of the time. Bipolar disorder. This causes extreme mood changes from manic episodes of very high energy to extreme lows of depression. Seasonal affective disorder (SAD). This is a type of depression that affects you during the same season each year.  Most often people experience SAD during the fall and winter months when days are shorter and there is less light. What are the symptoms? Depression You may: 
Feel sad or hopeless nearly every day. Lose interest in or not get pleasure from most daily activities. You feel this way nearly every day. Have low energy, changes in your appetite, or changes in how well you sleep. Have trouble concentrating. Think about death and suicide. Keep the numbers for these national suicide hotlines: 6-208-374-TALK (6-469.332.6804) and 4-523-PVGCEVI (9-983.210.2997). If you or someone you know talks about suicide or feeling hopeless, get help right away. Bipolar disorder Symptoms depend on your mood swings. You may: 
Feel very happy, energetic, or on edge. Feel like you need very little sleep. Feel overly self-confident. Do impulsive things, such as spending a lot of money. Feel sad or hopeless. Have racing thoughts or trouble thinking and making decisions. Lose interest in things you have enjoyed in the past. 
Think about death and suicide. Keep the numbers for these national suicide hotlines: 8-576-262-TALK (3-584.100.2419) and 7-229-LJFRFWC (5-513.524.3490). If you or someone you know talks about suicide or feeling hopeless, get help right away. Seasonal affective disorder (SAD) Symptoms come and go at about the same time each year. For most people with SAD, symptoms come during the winter when there is less daylight. You may: 
Feel sad, grumpy, carter, or anxious. Lose interest in your usual activities. Eat more and crave carbohydrates, such as bread and pasta. Gain weight. Sleep more and feel drowsy during the daytime. How are mood disorders treated? Mood disorders can be treated with medicines or counseling, or a combination of both. Medicines for depression and SAD may include antidepressants. Medicines for bipolar disorder may include: 
Mood stabilizers. Antipsychotics. Benzodiazepines. Counseling may involve cognitive-behavioral therapy. It teaches you how to change the ways you think and behave. This can help you stop thinking bad thoughts about yourself and your life. Light therapy is the main treatment for SAD. This therapy uses a special kind of lamp. You let the lamp shine on you at certain times, usually in the morning. This may help your symptoms during the months when there is less sunlight. Healthy lifestyle Healthy lifestyle changes may help you feel better. Get at least 30 minutes of exercise on most days of the week. Walking is a good choice. Eat a healthy diet. Include fruits, vegetables, lean proteins, and whole grains in your diet each day. Keep a regular sleep schedule. Try for 8 hours of sleep a night. Find ways to manage stress, such as relaxation exercises. Avoid alcohol and illegal drugs. Follow-up care is a key part of your treatment and safety. Be sure to make and go to all appointments, and call your doctor if you are having problems. It's also a good idea to know your test results and keep a list of the medicines you take. Where can you learn more? Go to http://mazin-kenyatta.info/. Enter D076 in the search box to learn more about \"Learning About Mood Disorders. \" Current as of: July 26, 2016 Content Version: 11.1 © 1871-7623 [employer]. Care instructions adapted under license by [employer] (which disclaims liability or warranty for this information). If you have questions about a medical condition or this instruction, always ask your healthcare professional. [address] any warranty or liability for your use of this information. · Introducing [facility]! Chuck Herbert introduces Inmagic patient portal. Now you can access parts of your medical record, email your doctor's office, and request medication refills online.    
 
1. In your internet browser, go to [email or web address] 2. Click on the First Time User? Click Here link in the Sign In box. You will see the New Member Sign Up page. 3. Enter your Equiendo Access Code exactly as it appears below. You will not need to use this code after youve completed the sign-up process. If you do not sign up before the expiration date, you must request a new code. · Equiendo Access Code: [email or web address] Expires: 5/14/2017  9:22 AM 
 
4. Enter the last four digits of your Social Security Number (xxxx) and Date of Birth (mm/dd/yyyy) as indicated and click Submit. You will be taken to the next sign-up page. 5. Create a Equiendo ID. This will be your Equiendo login ID and cannot be changed, so think of one that is secure and easy to remember. 6. Create a Equiendo password. You can change your password at any time. 7. Enter your Password Reset Question and Answer. This can be used at a later time if you forget your password. 8. Enter your e-mail address. You will receive e-mail notification when new information is available in [address] 9. Click Sign Up. You can now view and download portions of your medical record. 10. Click the Download Summary menu link to download a portable copy of your medical information. If you have questions, please visit the Frequently Asked Questions section of the Equiendo website. Remember, Equiendo is NOT to be used for urgent needs. For medical emergencies, dial 911. Now available from your iPhone and Android! Please provide this summary of care documentation to your next provider. Your primary care clinician is listed as [address] If you have any questions after today's visit, please call [phone].

## 2017-02-16 LAB
C TRACH RRNA SPEC QL NAA+PROBE: POSITIVE
N GONORRHOEA RRNA SPEC QL NAA+PROBE: NEGATIVE
T VAGINALIS RRNA SPEC QL NAA+PROBE: NEGATIVE

## 2017-02-17 ENCOUNTER — TELEPHONE (OUTPATIENT)
Dept: INTERNAL MEDICINE CLINIC | Age: 32
End: 2017-02-17

## 2017-02-17 NOTE — TELEPHONE ENCOUNTER
Mom is insisting she is on Hippa, but after reviewing decision was made that we need to speak directly with patient about results. Mom hung up before being able to explain that.

## 2017-02-17 NOTE — TELEPHONE ENCOUNTER
I let mom know that we do need to speak directly to the patient to give results even though she is on the hippa. Mom does not know when she will speak with pt again. Told mom to have him contact the office as soon as he can. He does not have a direct number for us to contact him.

## 2017-02-17 NOTE — TELEPHONE ENCOUNTER
I let mom know that we do need to speak directly to the patient to give results even though she is on the hippa. Mom does not know when she will speak with pt again. Told mom to have him contact the office as soon as he can. He does not have a direct number for us to contact him. See other telephone encounter.

## 2017-03-06 ENCOUNTER — OFFICE VISIT (OUTPATIENT)
Dept: BEHAVIORAL/MENTAL HEALTH CLINIC | Age: 32
End: 2017-03-06

## 2017-03-06 VITALS
BODY MASS INDEX: 23.72 KG/M2 | HEIGHT: 73 IN | HEART RATE: 65 BPM | SYSTOLIC BLOOD PRESSURE: 92 MMHG | WEIGHT: 179 LBS | OXYGEN SATURATION: 97 % | DIASTOLIC BLOOD PRESSURE: 66 MMHG

## 2017-03-06 DIAGNOSIS — Z78.9 ALCOHOL USE: ICD-10-CM

## 2017-03-06 DIAGNOSIS — F41.9 ANXIETY: ICD-10-CM

## 2017-03-06 DIAGNOSIS — Z78.9 CAFFEINE USE: ICD-10-CM

## 2017-03-06 DIAGNOSIS — F39 MOOD DISORDER (HCC): Primary | ICD-10-CM

## 2017-03-06 DIAGNOSIS — F32.1 MODERATE SINGLE CURRENT EPISODE OF MAJOR DEPRESSIVE DISORDER (HCC): ICD-10-CM

## 2017-03-06 RX ORDER — DIVALPROEX SODIUM 500 MG/1
500 TABLET, DELAYED RELEASE ORAL 2 TIMES DAILY
Qty: 60 TAB | Refills: 0 | Status: SHIPPED | OUTPATIENT
Start: 2017-03-06 | End: 2017-04-18 | Stop reason: SDUPTHER

## 2017-03-06 RX ORDER — BUPROPION HYDROCHLORIDE 300 MG/1
300 TABLET ORAL
Qty: 30 TAB | Refills: 0 | Status: SHIPPED | OUTPATIENT
Start: 2017-03-06 | End: 2017-04-18 | Stop reason: SDUPTHER

## 2017-03-06 RX ORDER — HYDROXYZINE 25 MG/1
25 TABLET, FILM COATED ORAL
Qty: 120 TAB | Refills: 0 | Status: SHIPPED | OUTPATIENT
Start: 2017-03-06 | End: 2017-04-18 | Stop reason: SDUPTHER

## 2017-03-06 NOTE — PROGRESS NOTES
Psychiatric Outpatient Progress Note    Account Number:  [id number]  Name: Cony Garcia    SUBJECTIVE:   CHIEF COMPLAINT:  Cony Garcia is a 32 y.o. male and was seen today for follow-up of psychiatric condition and psychotropic medication management. He was seen with his counselor, Mr Curly Arauz. HPI:    Derek Enriquez reports the following psychiatric symptoms:  depression and anxiety. The symptoms have been present for few years and are of moderate to high severity. The symptoms occur daily. Reported sleeping for 2-4 hrs and reported difficulty maintaining sleep due to housing issues. he lives with his sister and sleeping on a chair. Client reported energy level is low, difficulty focus and concentrate, mild improvement in  racing thoughts, improved mood swings, easily gets agitated. Reported gets irritable and feeling depressed due to current life situation. Contributing factors has arguments with ex-wife over phone , include:just released from retirement, 13 driving offence, 2 felony , abduction and conspiracy charge- did 2 yrs of retirement. Recent break up with girl friend, Graciela Beth time- 14 times. Financial , unemployed, unable to find job. Has court cases. Patient denies SI/HI/SIB. Side Effects:  none      Fam/Soc Hx (from [place] with updates):    Family History   Problem Relation Age of Onset    Asthma Mother       Social History   Substance Use Topics    Smoking status: Current Every Day Smoker     Packs/day: 1.50     Types: Cigars    Smokeless tobacco: Never Used    Alcohol use 0.0 oz/week     0 Standard drinks or equivalent per week      Comment: socially       REVIEW OF SYSTEMS:  Psychiatric:  depression, anxiety.   Appetite:no change from normal   Sleep: poor maintaining sleep                    Mental Status exam: WNL except for      Sensorium  oriented to time, place and person   Relations cooperative    Eye Contact    appropriate   Appearance:  age appropriate, casually dressed, piercings and tattooed   Motor Behavior/Gait:  gait stable and within normal limits   Speech:  normal pitch and normal volume   Thought Process: goal directed, logical and within normal limits   Thought Content free of delusions and free of hallucinations   Suicidal ideations no plan , no intention and none   Homicidal ideations no plan , no intention and none   Mood:   anxious, irritable and labile   Affect:  anxious, depressed, irritable, labile and mood-congruent   Memory recent  adequate   Memory remote:  adequate   Concentration:  adequate   Abstraction:  abstract   Insight:  limited   Reliability fair   Judgment:  limited       MEDICAL DECISION MAKING  Data: pertinent labs, imaging, medical records and diagnostic tests reviewed and incorporated in diagnosis and treatment plan    No Known Allergies     Current Outpatient Prescriptions   Medication Sig Dispense Refill    hydrOXYzine HCl (ATARAX) 25 mg tablet Take 1 Tab by mouth every six (6) hours as needed for Anxiety. 120 Tab 0    buPROPion XL (WELLBUTRIN XL) 150 mg tablet Take 1 Tab by mouth every morning. 30 Tab 0    divalproex DR (DEPAKOTE) 250 mg tablet Take 1 Tab by mouth two (2) times a day. 60 Tab 0    albuterol (PROVENTIL HFA, VENTOLIN HFA, PROAIR HFA) 90 mcg/actuation inhaler Take 2 Puffs by inhalation every four (4) hours as needed for Wheezing. Use with spacer. 1 Inhaler 2    beclomethasone (QVAR) 80 mcg/actuation inhaler Take 2 Puffs by inhalation daily. 8.7 g 5        Visit Vitals    BP 92/66 (BP 1 Location: Left arm, BP Patient Position: Sitting)    Pulse 65    Ht 6' 1\" (1.854 m)    Wt 81.2 kg (179 lb)    SpO2 97%    BMI 23.62 kg/m2         Problems addressed today:  Mood disorder nos, adjustment disorder with anxiety and depression, caffeine use r/o Bipolar disorder, Insomnia, alcohol use, caffeine use , nicotine use. Assessment:  reviewed; received xanax and Adderall from Dr Navin Wood on 09/13/16.     Araceli Wise  is a 32 y.o. afro - american  male  is responding to treatment. H/o learning disability and was in special school. Didnot receive any treatment for ADHD in middle and high school. Received Adderall since 2013 and currently taking as prn. Client reported improvement in irritability, improved anxiety, occasional racing thoughts, has mood swings, has difficulty focus and concentration, has anger issues and gets easily agitated, difficulty  focus and concentration. Reported sleeping for 4-5 hrs, not able to sleep due to lack of housing. Reported feels tired due to lack of sleep. Living with sister now. Reduced intake of energy drinks and alcohol use. Plan to increase depakote and increase bupropion to stabilize mood. Risk Scoring- chronic illnesses and prescription drug management   Patient denies SI/HI/SIB. No evidence of AH/VH or delusions. Treatment Plan:  1. Medications:          Medication Changes/Adjustments:Increase Depakote 500 mg BID                                                               Begin Hydroxyzine 25 mg QID                                                             Increaset Bupropion 300 mg XL- please take in morning                                                                Ordered depakote level    Current Outpatient Prescriptions   Medication Sig Dispense Refill    hydrOXYzine HCl (ATARAX) 25 mg tablet Take 1 Tab by mouth every six (6) hours as needed for Anxiety. 120 Tab 0    buPROPion XL (WELLBUTRIN XL) 150 mg tablet Take 1 Tab by mouth every morning. 30 Tab 0    divalproex DR (DEPAKOTE) 250 mg tablet Take 1 Tab by mouth two (2) times a day. 60 Tab 0    albuterol (PROVENTIL HFA, VENTOLIN HFA, PROAIR HFA) 90 mcg/actuation inhaler Take 2 Puffs by inhalation every four (4) hours as needed for Wheezing. Use with spacer. 1 Inhaler 2    beclomethasone (QVAR) 80 mcg/actuation inhaler Take 2 Puffs by inhalation daily.  8.7 g 5                  The following regarding medications was addressed:    (The risks and benefits of the proposed medication; the potential medication side effects ie    dry mouth, weight gain, GI upset, headache; patient given opportunity to ask questions)Pt was advised that studies have shown that use of Benzodiazepines over a  a long period of  time could predispose to dementia & memory loss . 2.  Counseling and coordination of care including instructions for treatment, risks/benefits, risk factor reduction and patient/family education. He agrees with the plan. Patient instructed to call with any side effects, questions or issues. Instructed patient to call the clinic, and if after hours call the provider on call ifclient experiences any suicidal thought or ideas to hurt self or other. Also instructed to call 911 or go to the ED. Patient verbalized understanding and agreed to call    3. Follow-up Disposition:  Return in about 4 weeks (around 4/3/2017) for med check and follow up. 4. Other: Nutritional/health counseling on diet and exercise. For reliable dietary information, go to www. EATRIGHT.org. PSYCHOTHERAPY:  approx 16 minutes  Type:  Supportive/Cognitive Behavioral psychotherapy provided  Focus:     Current problems- lives with sister and no place to sleep. Housing issues- homeless   Occupational issues-  unemployed,    Legal issues- has charges pending r/t  property destruction   Interpersonal conflicts- ex-wife    Psychoeducation provided  Treatment plan reviewed with patient-including diagnosis and medications    Worked on issues of denial & effects of substance dependency/use- caffeine and alcohol use. Laury Kay is partially progressing.     Shy De La Rosa NP  3/6/2017

## 2017-03-06 NOTE — PATIENT INSTRUCTIONS
Sleep Tips    What to avoid    · Do not have drinks with caffeine, such as coffee or black tea, for 8 hours before bed. · Do not smoke or use other types of tobacco near bedtime. Nicotine is a stimulant and can keep you awake. · Avoid drinking alcohol late in the evening, because it can cause you to wake in the middle of the night. · Do not eat a big meal close to bedtime. If you are hungry, eat a light snack. · Do not drink a lot of water close to bedtime, because the need to urinate may wake you up during the night. · Do not read or watch TV in bed. Use the bed only for sleeping and sexual activity. What to try    · Go to bed at the same time every night, and wake up at the same time every morning. Do not take naps during the day. · Keep your bedroom quiet, dark, and cool. · Get regular exercise, but not within 3 to 4 hours of your bedtime. · Sleep on a comfortable pillow and mattress. · If watching the clock makes you anxious, turn it facing away from you so you cannot see the time. · If you worry when you lie down, start a worry book. Well before bedtime, write down your worries, and then set the book and your concerns aside. · Try meditation or other relaxation techniques before you go to bed. · If you cannot fall asleep, get up and go to another room until you feel sleepy. Do something relaxing. Repeat your bedtime routine before you go to bed again. Make your house quiet and calm about an hour before bedtime. Turn down the lights, turn off the TV, log off the computer, and turn down the volume on music. This can help you relax after a busy day. Learning About Mood Disorders  What are mood disorders? Mood disorders are medical problems that affect how you feel. They can impact your moods, thoughts, and actions. Mood disorders include:  Depression. This causes you to feel sad and hopeless for much of the time. Bipolar disorder.  This causes extreme mood changes from manic episodes of very high energy to extreme lows of depression. Seasonal affective disorder (SAD). This is a type of depression that affects you during the same season each year. Most often people experience SAD during the fall and winter months when days are shorter and there is less light. What are the symptoms? Depression  You may:  Feel sad or hopeless nearly every day. Lose interest in or not get pleasure from most daily activities. You feel this way nearly every day. Have low energy, changes in your appetite, or changes in how well you sleep. Have trouble concentrating. Think about death and suicide. Keep the numbers for these national suicide hotlines: 0-455-122-TALK (2-904.997.5911) and 8-829-VHBZFOM (9-485.431.3093). If you or someone you know talks about suicide or feeling hopeless, get help right away. Bipolar disorder  Symptoms depend on your mood swings. You may:  Feel very happy, energetic, or on edge. Feel like you need very little sleep. Feel overly self-confident. Do impulsive things, such as spending a lot of money. Feel sad or hopeless. Have racing thoughts or trouble thinking and making decisions. Lose interest in things you have enjoyed in the past.  Think about death and suicide. Keep the numbers for these national suicide hotlines: 8-373-528-TALK (1-663.762.9686) and 7-015-NVXZVSZ (8-838.875.1094). If you or someone you know talks about suicide or feeling hopeless, get help right away. Seasonal affective disorder (SAD)  Symptoms come and go at about the same time each year. For most people with SAD, symptoms come during the winter when there is less daylight. You may:  Feel sad, grumpy, carter, or anxious. Lose interest in your usual activities. Eat more and crave carbohydrates, such as bread and pasta. Gain weight. Sleep more and feel drowsy during the daytime. How are mood disorders treated? Mood disorders can be treated with medicines or counseling, or a combination of both.   Medicines for depression and SAD may include antidepressants. Medicines for bipolar disorder may include:  Mood stabilizers. Antipsychotics. Benzodiazepines. Counseling may involve cognitive-behavioral therapy. It teaches you how to change the ways you think and behave. This can help you stop thinking bad thoughts about yourself and your life. Light therapy is the main treatment for SAD. This therapy uses a special kind of lamp. You let the lamp shine on you at certain times, usually in the morning. This may help your symptoms during the months when there is less sunlight. Healthy lifestyle  Healthy lifestyle changes may help you feel better. Get at least 30 minutes of exercise on most days of the week. Walking is a good choice. Eat a healthy diet. Include fruits, vegetables, lean proteins, and whole grains in your diet each day. Keep a regular sleep schedule. Try for 8 hours of sleep a night. Find ways to manage stress, such as relaxation exercises. Avoid alcohol and illegal drugs. Follow-up care is a key part of your treatment and safety. Be sure to make and go to all appointments, and call your doctor if you are having problems. It's also a good idea to know your test results and keep a list of the medicines you take. Where can you learn more? Go to http://mazin-kenyatta.info/. Enter B990 in the search box to learn more about \"Learning About Mood Disorders. \"  Current as of: July 26, 2016  Content Version: 11.1  © 3123-0848 [employer]. Care instructions adapted under license by [employer] (which disclaims liability or warranty for this information). If you have questions about a medical condition or this instruction, always ask your healthcare professional. [place] [address] any warranty or liability for your use of this information.   ·

## 2017-03-06 NOTE — MR AVS SNAPSHOT
Visit Information Date & Time Provider Department Dept. Phone Encounter #  
 3/6/2017 11:00 AM Magda Rosenbaum, [facility] [phone] [id number] Follow-up Instructions Return in about 4 weeks (around 4/3/2017) for med check and follow up. Upcoming Health Maintenance Date Due DTaP/Tdap/Td series (2 - Td) 2/13/2027 Allergies as of 3/6/2017  Review Complete On: 3/6/2017 By: Magda Rosenbaum NP No Known Allergies Current Immunizations  Reviewed on 2/13/2017 No immunizations on file. Not reviewed this visit You Were Diagnosed With   
  
 Codes Comments Mood disorder ([facility]    -  Primary ICD-10-CM: F39 
ICD-9-CM: 296.90 Moderate single current episode of major depressive disorder (HCC)     ICD-10-CM: F32.1 ICD-9-CM: 296.22 Anxiety     ICD-10-CM: F41.9 ICD-9-CM: 300.00 Alcohol use     ICD-10-CM: Z78.9 ICD-9-CM: V49.89 Caffeine use     ICD-10-CM: F15.90 ICD-9-CM: V49.89 Vitals BP Pulse Height(growth percentile) Weight(growth percentile) SpO2 BMI  
 92/66 (BP 1 Location: Left arm, BP Patient Position: Sitting) 65 6' 1\" (1.854 m) 179 lb (81.2 kg) 97% 23.62 kg/m2 Smoking Status Current Every Day Smoker Vitals History BMI and BSA Data Body Mass Index Body Surface Area  
 23.62 kg/m 2 2.05 m 2 Preferred Pharmacy [facility] Name Phone [id number] [phone] Your Updated Medication List  
  
   
This list is accurate as of: 3/6/17 11:45 AM.  Always use your most recent med list.  
  
  
  
  
 albuterol 90 mcg/actuation inhaler Commonly known as:  PROVENTIL HFA, VENTOLIN HFA, PROAIR HFA Take 2 Puffs by inhalation every four (4) hours as needed for Wheezing. Use with spacer. beclomethasone 80 mcg/actuation inhaler Commonly known as:  QVAR  
 Take 2 Puffs by inhalation daily. buPROPion  mg XL tablet Commonly known as:  Kathe Ferries Take 1 Tab by mouth every morning. divalproex  mg tablet Commonly known as:  DEPAKOTE Take 1 Tab by mouth two (2) times a day.  
  
 hydrOXYzine HCl 25 mg tablet Commonly known as:  ATARAX Take 1 Tab by mouth every six (6) hours as needed for Anxiety. Prescriptions Sent to Pharmacy Refills buPROPion XL (WELLBUTRIN XL) 300 mg XL tablet 0 Sig: Take 1 Tab by mouth every morning. Class: Normal  
 Pharmacy: [address] Ph #: [phone] Route: Oral  
 divalproex DR (DEPAKOTE) 500 mg tablet 0 Sig: Take 1 Tab by mouth two (2) times a day. Class: Normal  
 Pharmacy: [address] Ph #: [phone] Route: Oral  
 hydrOXYzine HCl (ATARAX) 25 mg tablet 0 Sig: Take 1 Tab by mouth every six (6) hours as needed for Anxiety. Class: Normal  
 Pharmacy: [address] Ph #: [phone] Route: Oral  
  
We Performed the Following VALPROIC ACID [08424 CPT(R)] Follow-up Instructions Return in about 4 weeks (around 4/3/2017) for med check and follow up. Patient Instructions Sleep Tips What to avoid   
· Do not have drinks with caffeine, such as coffee or black tea, for 8 hours before bed. · Do not smoke or use other types of tobacco near bedtime. Nicotine is a stimulant and can keep you awake. · Avoid drinking alcohol late in the evening, because it can cause you to wake in the middle of the night. · Do not eat a big meal close to bedtime. If you are hungry, eat a light snack.  
· Do not drink a lot of water close to bedtime, because the need to urinate may wake you up during the night. · Do not read or watch TV in bed. Use the bed only for sleeping and sexual activity. What to try   
· Go to bed at the same time every night, and wake up at the same time every morning. Do not take naps during the day. · Keep your bedroom quiet, dark, and cool. · Get regular exercise, but not within 3 to 4 hours of your bedtime. · Sleep on a comfortable pillow and mattress. · If watching the clock makes you anxious, turn it facing away from you so you cannot see the time. · If you worry when you lie down, start a worry book. Well before bedtime, write down your worries, and then set the book and your concerns aside. · Try meditation or other relaxation techniques before you go to bed. · If you cannot fall asleep, get up and go to another room until you feel sleepy. Do something relaxing. Repeat your bedtime routine before you go to bed again. Make your house quiet and calm about an hour before bedtime. Turn down the lights, turn off the TV, log off the computer, and turn down the volume on music. This can help you relax after a busy day. Learning About Mood Disorders What are mood disorders? Mood disorders are medical problems that affect how you feel. They can impact your moods, thoughts, and actions. Mood disorders include: 
Depression. This causes you to feel sad and hopeless for much of the time. Bipolar disorder. This causes extreme mood changes from manic episodes of very high energy to extreme lows of depression. Seasonal affective disorder (SAD). This is a type of depression that affects you during the same season each year. Most often people experience SAD during the fall and winter months when days are shorter and there is less light. What are the symptoms? Depression You may: 
Feel sad or hopeless nearly every day. Lose interest in or not get pleasure from most daily activities. You feel this way nearly every day. Have low energy, changes in your appetite, or changes in how well you sleep. Have trouble concentrating. Think about death and suicide. Keep the numbers for these national suicide hotlines: 0-088-588-TALK (1-145.476.2246) and 5-333-DXHSCXH (3-917.156.8152). If you or someone you know talks about suicide or feeling hopeless, get help right away. Bipolar disorder Symptoms depend on your mood swings. You may: 
Feel very happy, energetic, or on edge. Feel like you need very little sleep. Feel overly self-confident. Do impulsive things, such as spending a lot of money. Feel sad or hopeless. Have racing thoughts or trouble thinking and making decisions. Lose interest in things you have enjoyed in the past. 
Think about death and suicide. Keep the numbers for these national suicide hotlines: 5-831-600-TALK (9-679.530.1025) and 0-009-QKCMSTH (2-347.413.5404). If you or someone you know talks about suicide or feeling hopeless, get help right away. Seasonal affective disorder (SAD) Symptoms come and go at about the same time each year. For most people with SAD, symptoms come during the winter when there is less daylight. You may: 
Feel sad, grumpy, carter, or anxious. Lose interest in your usual activities. Eat more and crave carbohydrates, such as bread and pasta. Gain weight. Sleep more and feel drowsy during the daytime. How are mood disorders treated? Mood disorders can be treated with medicines or counseling, or a combination of both. Medicines for depression and SAD may include antidepressants. Medicines for bipolar disorder may include: 
Mood stabilizers. Antipsychotics. Benzodiazepines. Counseling may involve cognitive-behavioral therapy. It teaches you how to change the ways you think and behave. This can help you stop thinking bad thoughts about yourself and your life. Light therapy is the main treatment for SAD.  This therapy uses a special kind of lamp. You let the lamp shine on you at certain times, usually in the morning. This may help your symptoms during the months when there is less sunlight. Healthy lifestyle Healthy lifestyle changes may help you feel better. Get at least 30 minutes of exercise on most days of the week. Walking is a good choice. Eat a healthy diet. Include fruits, vegetables, lean proteins, and whole grains in your diet each day. Keep a regular sleep schedule. Try for 8 hours of sleep a night. Find ways to manage stress, such as relaxation exercises. Avoid alcohol and illegal drugs. Follow-up care is a key part of your treatment and safety. Be sure to make and go to all appointments, and call your doctor if you are having problems. It's also a good idea to know your test results and keep a list of the medicines you take. Where can you learn more? Go to http://mazin-kenyatta.info/. Enter H204 in the search box to learn more about \"Learning About Mood Disorders. \" Current as of: July 26, 2016 Content Version: 11.1 © 9200-6725 [employer]. Care instructions adapted under license by [employer] (which disclaims liability or warranty for this information). If you have questions about a medical condition or this instruction, always ask your healthcare professional. [address] any warranty or liability for your use of this information. · Introducing [facility]! Janet Wiseman introduces [employer] patient portal. Now you can access parts of your medical record, email your doctor's office, and request medication refills online. 1. In your internet browser, go to [email or web address] 2. Click on the First Time User? Click Here link in the Sign In box. You will see the New Member Sign Up page. 3. Enter your [employer] Access Code exactly as it appears below.  You will not need to use this code after youve completed the sign-up process. If you do not sign up before the expiration date, you must request a new code. · [email or web address] Access Code: [email or web address] Expires: 5/14/2017  9:22 AM 
 
4. Enter the last four digits of your Social Security Number (xxxx) and Date of Birth (mm/dd/yyyy) as indicated and click Submit. You will be taken to the next sign-up page. 5. Create a J[employer] ID. This will be your [employer] login ID and cannot be changed, so think of one that is secure and easy to remember. 6. Create a [employer] password. You can change your password at any time. 7. Enter your Password Reset Question and Answer. This can be used at a later time if you forget your password. 8. Enter your e-mail address. You will receive e-mail notification when new information is available in [address] 9. Click Sign Up. You can now view and download portions of your medical record. 10. Click the Download Summary menu link to download a portable copy of your medical information. If you have questions, please visit the Frequently Asked Questions section of the [employer] website. Remember, [employer] is NOT to be used for urgent needs. For medical emergencies, dial 911. Now available from your iPhone and Android! Please provide this summary of care documentation to your next provider. Your primary care clinician is listed as [address] If you have any questions after today's visit, please call [phone].

## 2017-04-18 DIAGNOSIS — F32.1 MODERATE SINGLE CURRENT EPISODE OF MAJOR DEPRESSIVE DISORDER (HCC): ICD-10-CM

## 2017-04-18 DIAGNOSIS — F39 MOOD DISORDER (HCC): ICD-10-CM

## 2017-04-18 DIAGNOSIS — F41.9 ANXIETY: ICD-10-CM

## 2017-04-18 RX ORDER — DIVALPROEX SODIUM 500 MG/1
500 TABLET, DELAYED RELEASE ORAL 2 TIMES DAILY
Qty: 20 TAB | Refills: 0 | Status: SHIPPED | OUTPATIENT
Start: 2017-04-18 | End: 2017-06-21 | Stop reason: SDUPTHER

## 2017-04-18 RX ORDER — BUPROPION HYDROCHLORIDE 300 MG/1
300 TABLET ORAL
Qty: 30 TAB | Refills: 0 | OUTPATIENT
Start: 2017-04-18

## 2017-04-18 RX ORDER — HYDROXYZINE 25 MG/1
25 TABLET, FILM COATED ORAL
Qty: 120 TAB | Refills: 0 | OUTPATIENT
Start: 2017-04-18

## 2017-04-18 RX ORDER — BUPROPION HYDROCHLORIDE 300 MG/1
300 TABLET ORAL
Qty: 10 TAB | Refills: 0 | Status: SHIPPED | OUTPATIENT
Start: 2017-04-18 | End: 2017-06-21 | Stop reason: SDUPTHER

## 2017-04-18 RX ORDER — DIVALPROEX SODIUM 500 MG/1
500 TABLET, DELAYED RELEASE ORAL 2 TIMES DAILY
Qty: 60 TAB | Refills: 0 | OUTPATIENT
Start: 2017-04-18

## 2017-04-18 RX ORDER — HYDROXYZINE 25 MG/1
25 TABLET, FILM COATED ORAL
Qty: 40 TAB | Refills: 0 | Status: SHIPPED | OUTPATIENT
Start: 2017-04-18 | End: 2017-06-21 | Stop reason: SDUPTHER

## 2017-06-21 DIAGNOSIS — F39 MOOD DISORDER (HCC): ICD-10-CM

## 2017-06-21 DIAGNOSIS — F41.9 ANXIETY: ICD-10-CM

## 2017-06-21 DIAGNOSIS — F32.1 MODERATE SINGLE CURRENT EPISODE OF MAJOR DEPRESSIVE DISORDER (HCC): ICD-10-CM

## 2017-06-21 RX ORDER — BUPROPION HYDROCHLORIDE 300 MG/1
300 TABLET ORAL
Qty: 30 TAB | Refills: 0 | Status: SHIPPED | OUTPATIENT
Start: 2017-06-21 | End: 2017-07-21 | Stop reason: SDUPTHER

## 2017-06-21 RX ORDER — HYDROXYZINE 25 MG/1
25 TABLET, FILM COATED ORAL
Qty: 120 TAB | Refills: 0 | Status: SHIPPED | OUTPATIENT
Start: 2017-06-21 | End: 2017-07-21 | Stop reason: SDUPTHER

## 2017-06-21 RX ORDER — DIVALPROEX SODIUM 500 MG/1
500 TABLET, DELAYED RELEASE ORAL 2 TIMES DAILY
Qty: 60 TAB | Refills: 0 | Status: SHIPPED | OUTPATIENT
Start: 2017-06-21 | End: 2017-07-21 | Stop reason: SDDI

## 2017-07-21 ENCOUNTER — OFFICE VISIT (OUTPATIENT)
Dept: BEHAVIORAL/MENTAL HEALTH CLINIC | Age: 32
End: 2017-07-21

## 2017-07-21 VITALS
DIASTOLIC BLOOD PRESSURE: 83 MMHG | WEIGHT: 176 LBS | HEIGHT: 73 IN | SYSTOLIC BLOOD PRESSURE: 127 MMHG | HEART RATE: 70 BPM | OXYGEN SATURATION: 99 % | BODY MASS INDEX: 23.33 KG/M2

## 2017-07-21 DIAGNOSIS — F39 MOOD DISORDER (HCC): ICD-10-CM

## 2017-07-21 DIAGNOSIS — F32.1 MODERATE SINGLE CURRENT EPISODE OF MAJOR DEPRESSIVE DISORDER (HCC): ICD-10-CM

## 2017-07-21 DIAGNOSIS — F41.9 ANXIETY: ICD-10-CM

## 2017-07-21 RX ORDER — CITALOPRAM 20 MG/1
TABLET, FILM COATED ORAL
Qty: 30 TAB | Refills: 0 | Status: SHIPPED | OUTPATIENT
Start: 2017-07-21 | End: 2017-08-29 | Stop reason: SDDI

## 2017-07-21 RX ORDER — BUPROPION HYDROCHLORIDE 300 MG/1
300 TABLET ORAL
Qty: 30 TAB | Refills: 0 | Status: SHIPPED | OUTPATIENT
Start: 2017-07-21 | End: 2017-08-29 | Stop reason: SDDI

## 2017-07-21 RX ORDER — HYDROXYZINE 25 MG/1
25 TABLET, FILM COATED ORAL
Qty: 120 TAB | Refills: 0 | Status: SHIPPED | OUTPATIENT
Start: 2017-07-21 | End: 2017-08-29 | Stop reason: SDUPTHER

## 2017-07-21 NOTE — PATIENT INSTRUCTIONS
For reliable dietary information, go to www. EATRIGHT.org. Sleep Tips    What to avoid    · Do not have drinks with caffeine, such as coffee or black tea, for 8 hours before bed. · Do not smoke or use other types of tobacco near bedtime. Nicotine is a stimulant and can keep you awake. · Avoid drinking alcohol late in the evening, because it can cause you to wake in the middle of the night. · Do not eat a big meal close to bedtime. If you are hungry, eat a light snack. · Do not drink a lot of water close to bedtime, because the need to urinate may wake you up during the night. · Do not read or watch TV in bed. Use the bed only for sleeping and sexual activity. What to try    · Go to bed at the same time every night, and wake up at the same time every morning. Do not take naps during the day. · Keep your bedroom quiet, dark, and cool. · Get regular exercise, but not within 3 to 4 hours of your bedtime. · Sleep on a comfortable pillow and mattress. · If watching the clock makes you anxious, turn it facing away from you so you cannot see the time. · If you worry when you lie down, start a worry book. Well before bedtime, write down your worries, and then set the book and your concerns aside. · Try meditation or other relaxation techniques before you go to bed. · If you cannot fall asleep, get up and go to another room until you feel sleepy. Do something relaxing. Repeat your bedtime routine before you go to bed again. Make your house quiet and calm about an hour before bedtime. Turn down the lights, turn off the TV, log off the computer, and turn down the volume on music. This can help you relax after a busy day. Depression and Chronic Disease: Care Instructions  Your Care Instructions  A chronic disease is one that you have for a long time. Some chronic diseases can be controlled, but they usually cannot be cured. Depression is common in people with chronic diseases, but it often goes unnoticed.   Many people have concerns about seeking treatment for a mental health problem. You may think it's a sign of weakness, or you don't want people to know about it. It's important to overcome these reasons for not seeking treatment. Treating depression or anxiety is good for your health. Follow-up care is a key part of your treatment and safety. Be sure to make and go to all appointments, and call your doctor if you are having problems. It's also a good idea to know your test results and keep a list of the medicines you take. How can you care for yourself at home? Watch for symptoms of depression  The symptoms of depression are often subtle at first. You may think they are caused by your disease rather than depression. Or you may think it is normal to be depressed when you have a chronic disease. If you are depressed you may:  · Feel sad or hopeless. · Feel guilty or worthless. · Not enjoy the things you used to enjoy. · Feel hopeless, as though life is not worth living. · Have trouble thinking or remembering. · Have low energy, and you may not eat or sleep well. · Pull away from others. · Think often about death or killing yourself. (Keep the numbers for these national suicide hotlines: 1-189-650-TALK [1-178.210.6035] and 1-815-OQCLHJD [1-528.813.7480]. )  Get treatment  By treating your depression, you can feel more hopeful and have more energy. If you feel better, you may take better care of yourself, so your health may improve. · Talk to your doctor if you have any changes in mood during treatment for your disease. · Ask your doctor for help. Counseling, antidepressant medicine, or a combination of the two can help most people with depression. Often a combination works best. Counseling can also help you cope with having a chronic disease. When should you call for help? Call 911 anytime you think you may need emergency care. For example, call if:  · You feel like hurting yourself or someone else.   · Someone you know has depression and is about to attempt or is attempting suicide. Call your doctor now or seek immediate medical care if:  · You hear voices. · Someone you know has depression and:  ¨ Starts to give away his or her possessions. ¨ Uses illegal drugs or drinks alcohol heavily. ¨ Talks or writes about death, including writing suicide notes or talking about guns, knives, or pills. ¨ Starts to spend a lot of time alone. ¨ Acts very aggressively or suddenly appears calm. Watch closely for changes in your health, and be sure to contact your doctor if:  · You do not get better as expected. Where can you learn more? Go to http://mazin-kenyatta.info/. Enter S692 in the search box to learn more about \"Depression and Chronic Disease: Care Instructions. \"  Current as of: July 26, 2016  Content Version: 11.3  © 7749-3494 [employer]. Care instructions adapted under license by [employer] (which disclaims liability or warranty for this information). If you have questions about a medical condition or this instruction, always ask your healthcare professional. [address] any warranty or liability for your use of this information.

## 2017-07-21 NOTE — PROGRESS NOTES
Psychiatric Outpatient Progress Note    Account Number:  [id number]  Name: Stanford Berkowitz    SUBJECTIVE:   CHIEF COMPLAINT:  Stanford Berkowitz is a 28 y.o. male and was seen today for follow-up of psychiatric condition and psychotropic medication management. He was seen with his counselor, Mr Elida Thrasherond. HPI:    Recardo Setting reports the following psychiatric symptoms:  depression and anxiety. The symptoms have been present for few years and are of moderate to high severity. The symptoms occur daily. Reported sleeping for 8-9hrs . Client reported energy level is low, irritability, difficulty focus and concentrate, , denied mood swings. Reported gets irritable and feeling depressed due to current life situation. Reported that he is waiting on DNA result. Pt has five kids with 3 different women. Contributing factors  include: has arguments with ex-wife, just released from [facility], 13 driving offence, 2 felony , abduction and conspiracy charge- did 2 yrs of [facility]. Recent break up with girl friend, Armando davis- 14 times. Financial , unemployed, unable to find job. Has court cases. Patient denies SI/HI/SIB. Side Effects:  none      Fam/Soc Hx (from [place] with updates):    Family History   Problem Relation Age of Onset    Asthma Mother       Social History   Substance Use Topics    Smoking status: Current Every Day Smoker     Packs/day: 0.25     Types: Cigars    Smokeless tobacco: Never Used    Alcohol use 0.0 oz/week     0 Standard drinks or equivalent per week      Comment: socially       REVIEW OF SYSTEMS:  Psychiatric:  depression, anxiety.   Appetite:no change from normal   Sleep: poor maintaining sleep                    Mental Status exam: WNL except for      Sensorium  oriented to time, place and person   Relations cooperative    Eye Contact    appropriate   Appearance:  age appropriate, casually dressed, piercings and tattooed   Motor Behavior/Gait:  gait stable and within normal limits   Speech:  normal pitch and normal volume   Thought Process: goal directed, logical and within normal limits   Thought Content free of delusions and free of hallucinations   Suicidal ideations no plan , no intention and none   Homicidal ideations no plan , no intention and none   Mood:   anxious,and labile   Affect:  anxious, depressed, irritable, labile and mood-congruent   Memory recent  adequate   Memory remote:  adequate   Concentration:  adequate   Abstraction:  abstract   Insight:  limited   Reliability fair   Judgment:  limited       MEDICAL DECISION MAKING  Data: pertinent labs, imaging, medical records and diagnostic tests reviewed and incorporated in diagnosis and treatment plan    No Known Allergies     Current Outpatient Prescriptions   Medication Sig Dispense Refill    buPROPion XL (WELLBUTRIN XL) 300 mg XL tablet Take 1 Tab by mouth every morning. 30 Tab 0    hydrOXYzine HCl (ATARAX) 25 mg tablet Take 1 Tab by mouth every six (6) hours as needed for Anxiety. 120 Tab 0    citalopram (CELEXA) 20 mg tablet Please take half tablet daily x  5 days and then take 1 tablet daily 30 Tab 0    albuterol (PROVENTIL HFA, VENTOLIN HFA, PROAIR HFA) 90 mcg/actuation inhaler Take 2 Puffs by inhalation every four (4) hours as needed for Wheezing. Use with spacer. 1 Inhaler 2    beclomethasone (QVAR) 80 mcg/actuation inhaler Take 2 Puffs by inhalation daily. 8.7 g 5        Visit Vitals    /83 (BP 1 Location: Left arm, BP Patient Position: Sitting)    Pulse 70    Ht 6' 1\" (1.854 m)    Wt 79.8 kg (176 lb)    SpO2 99%    BMI 23.22 kg/m2         Problems addressed today:  Mood disorder nos, adjustment disorder with anxiety and depression, caffeine use, alcohol use, nicotine use. Antisocial personality disorder    Assessment:  reviewed; received xanax and Adderall from Dr Nia May on 09/13/16. Ammy Marroquin  is a 28 y.o. Afro - American  male  is partially responding to treatment.  H/o learning disability and was in special school. Rama has 5 kids from 1 women and is not paying child support and has housing issues, unemployed and is on SSI. Client today reported that he has stopped taking depakote,lab work and is buying alprazolam from street and does not know the dosage. Reported it is calming him and reported taking daily. Client reported taking hydroxyzine for insomnia and is benefiting with anxiety. Pt reported he is taking bupropion occasionally. Client reported energy level is low, irritability, difficulty focus and concentrate. Reported anxiety, denied any racing thoughts, or mood swings. Client reported that he would be med compliant. Reported relationship issues with his ex-wife and his mother. Reported sleeping for 8-9 hrs. He is living with his sister now. He is drinking alcohol occasionally. Plan to begin citalopram to target anxiety and depression. Educated on benzodiazepines use and it's  potential for tolerance as well as psychological addiction in some people,  generally for those with current substance abuse. Please go to ER for any signs of seizure activity or withdrawal symptoms of shakiness, confusion, disorientation. Recommended not to take without medical advice. Risk Scoring- chronic illnesses and prescription drug management   Patient denies SI/HI/SIB. No evidence of AH/VH or delusions. Treatment Plan:  1.   Medications:          Medication Changes/Adjustments:  discontinue  Depakote 500 mg BID - non compliance                                                               Begin Citalopram 20 mg daily - please take half tablet daily for 5 days and then take 1 tablet daily                                                              Continue Hydroxyzine 25 mg TID                                                               Continue Bupropion 300 mg XL- please take in morning                                                               Current Outpatient Prescriptions   Medication Sig Dispense Refill    buPROPion XL (WELLBUTRIN XL) 300 mg XL tablet Take 1 Tab by mouth every morning. 30 Tab 0    hydrOXYzine HCl (ATARAX) 25 mg tablet Take 1 Tab by mouth every six (6) hours as needed for Anxiety. 120 Tab 0    citalopram (CELEXA) 20 mg tablet Please take half tablet daily x  5 days and then take 1 tablet daily 30 Tab 0    albuterol (PROVENTIL HFA, VENTOLIN HFA, PROAIR HFA) 90 mcg/actuation inhaler Take 2 Puffs by inhalation every four (4) hours as needed for Wheezing. Use with spacer. 1 Inhaler 2    beclomethasone (QVAR) 80 mcg/actuation inhaler Take 2 Puffs by inhalation daily. 8.7 g 5                  The following regarding medications was addressed:    (The risks and benefits of the proposed medication; the potential medication side effects ie    dry mouth, weight gain, GI upset, headache; patient given opportunity to ask questions)Pt was advised that studies have shown that use of Benzodiazepines over a  a long period of  time could predispose to dementia & memory loss . 2.  Counseling and coordination of care including instructions for treatment, risks/benefits, risk factor reduction and patient/family education. He agrees with the plan. Patient instructed to call with any side effects, questions or issues. Instructed patient to call the clinic, and if after hours call the provider on call ifclient experiences any suicidal thought or ideas to hurt self or other. Also instructed to call 911 or go to the ED. Patient verbalized understanding and agreed to call    3. Follow-up Disposition:  Return in about 4 weeks (around 8/18/2017) for med check and follow up. 4. Other: Nutritional/health counseling on diet and exercise. For reliable dietary information, go to www. [employer]AT[employer].org.   PSYCHOTHERAPY:  approx 16 minutes  Type:  Supportive/Cognitive Behavioral psychotherapy provided  Focus:     Current problems- lives with sister   Housing issues- homeless   Occupational issues-  unemployed, SSI   Legal issues- has charges pending r/t  property destruction   Interpersonal conflicts- ex-wife and mother    Psychoeducation provided  Treatment plan reviewed with patient-including diagnosis and medications    Worked on issues of denial & effects of substance dependency/use- caffeine and alcohol use, alprazolam    [facility] is partially progressing.     Brenda Rust NP  7/21/2017

## 2017-08-29 ENCOUNTER — OFFICE VISIT (OUTPATIENT)
Dept: BEHAVIORAL/MENTAL HEALTH CLINIC | Age: 32
End: 2017-08-29

## 2017-08-29 VITALS
WEIGHT: 174 LBS | BODY MASS INDEX: 23.06 KG/M2 | DIASTOLIC BLOOD PRESSURE: 70 MMHG | HEIGHT: 73 IN | SYSTOLIC BLOOD PRESSURE: 112 MMHG | OXYGEN SATURATION: 98 % | HEART RATE: 77 BPM

## 2017-08-29 DIAGNOSIS — F39 MOOD DISORDER (HCC): ICD-10-CM

## 2017-08-29 DIAGNOSIS — F41.9 ANXIETY: ICD-10-CM

## 2017-08-29 DIAGNOSIS — Z91.14 NON COMPLIANCE W MEDICATION REGIMEN: Primary | ICD-10-CM

## 2017-08-29 RX ORDER — HYDROXYZINE 25 MG/1
25 TABLET, FILM COATED ORAL
Qty: 120 TAB | Refills: 2 | Status: SHIPPED | OUTPATIENT
Start: 2017-08-29 | End: 2017-11-16 | Stop reason: SDUPTHER

## 2017-08-29 NOTE — PROGRESS NOTES
Psychiatric Outpatient Progress Note    Account Number:  [id number]  Name: Rosalina Ospina    SUBJECTIVE:   CHIEF COMPLAINT:  Rosalina Ospina is a 28 y.o. male and was seen today for follow-up of psychiatric condition and psychotropic medication management. He was seen with his counselor, Mr Kiran Nicolle. HPI:    Franca Null reports the following psychiatric symptoms:  depression and anxiety. The symptoms have been present for few years and are of moderate to high severity. The symptoms occur daily. Reported sleeping for 8-9hrs . Client reported energy level is fair, occasional irritability related to stressors only. Able to  focus and concentrate,  denied mood swings. Denied any Si or Hi or VAh or any hopelessness or helplesssenss or passive suicide thoughts. . Pt has five kids with 3 different women. Contributing factors  include: has arguments with ex-wife, just released from long-term, 13 driving offence, 2 felony , abduction and conspiracy charge- did 2 yrs of [facility]. Recent break up with girl friend, Lisa Hopper time- 14 times. Financial , unemployed, unable to find job. Has court cases. Patient denies SI/HI/SIB. Side Effects:  none      Fam/Soc Hx (from [place] with updates):    Family History   Problem Relation Age of Onset    Asthma Mother       Social History   Substance Use Topics    Smoking status: Current Every Day Smoker     Packs/day: 0.25     Types: Cigars    Smokeless tobacco: Never Used    Alcohol use 0.0 oz/week     0 Standard drinks or equivalent per week      Comment: socially       REVIEW OF SYSTEMS:  Psychiatric:  depression, anxiety.   Appetite:no change from normal   Sleep: poor maintaining sleep                    Mental Status exam: WNL except for      Sensorium  oriented to time, place and person   Relations cooperative    Eye Contact    appropriate   Appearance:  age appropriate, casually dressed, piercings and tattooed   Motor Behavior/Gait:  gait stable and within normal limits   Speech: normal pitch and normal volume   Thought Process: goal directed, logical and within normal limits   Thought Content free of delusions and free of hallucinations   Suicidal ideations no plan , no intention and none   Homicidal ideations no plan , no intention and none   Mood:   anxious,and labile   Affect:  anxious, irritable,and mood-congruent   Memory recent  adequate   Memory remote:  adequate   Concentration:  adequate   Abstraction:  abstract   Insight:  limited   Reliability fair   Judgment:  limited       MEDICAL DECISION MAKING  Data: pertinent labs, imaging, medical records and diagnostic tests reviewed and incorporated in diagnosis and treatment plan    No Known Allergies     Current Outpatient Prescriptions   Medication Sig Dispense Refill    buPROPion XL (WELLBUTRIN XL) 300 mg XL tablet Take 1 Tab by mouth every morning. 30 Tab 0    hydrOXYzine HCl (ATARAX) 25 mg tablet Take 1 Tab by mouth every six (6) hours as needed for Anxiety. 120 Tab 0    citalopram (CELEXA) 20 mg tablet Please take half tablet daily x  5 days and then take 1 tablet daily 30 Tab 0    albuterol (PROVENTIL HFA, VENTOLIN HFA, PROAIR HFA) 90 mcg/actuation inhaler Take 2 Puffs by inhalation every four (4) hours as needed for Wheezing. Use with spacer. 1 Inhaler 2    beclomethasone (QVAR) 80 mcg/actuation inhaler Take 2 Puffs by inhalation daily. 8.7 g 5        Visit Vitals    Ht 6' 1\" (1.854 m)         Problems addressed today:  Mood disorder nos, adjustment disorder with anxiety and depression, caffeine use, alcohol use, nicotine use. Antisocial personality disorder    Assessment:  reviewed; received xanax and Adderall from Dr Jenna Rosas on 09/13/16. Griselda Daniels  is a 28 y.o. Afro - American  male  is partially responding to treatment. H/o learning disability and was in special school. Client has 5 kids from 1 women and is not paying child support and has housing issues, unemployed and is on SSI.    Client today reported that he has stopped taking depakote,lab work and is buying alprazolam from street and does not know the dosage. Reported it is calming him and reported taking daily. Client reported taking hydroxyzine for insomnia and is benefiting with anxiety. Pt reported he has not taking any Citalopram and bupropion. Client reported energy level is fair, able to focus and concentrate. Client reported irritability and anxiety related to family stressors. Reported hydroxyzine is benefiting. Client is untruthful about his medication use and buying xanax off the street. Reported relationship issues with his ex-wife and his mother. He has never paid child support. Reported sleeping for 8-9 hrs. He is living with his sister now. He is drinking alcohol occasionally. Client hs been non compliant with his medication regimen. Client has mostly not interested in working and solving issues related around it. ? Secondary gain. He receiving SSI. Client does not exhibit or reported nay symptoms of depression or mood. Plan to discontinue citalopram and Bupropion r/t non compliance. Plan to continue hydroxyzine. Educated on benzodiazepines use and it's  potential for tolerance as well as psychological addiction in some people,  generally for those with current substance abuse. Please go to ER for any signs of seizure activity or withdrawal symptoms of shakiness, confusion, disorientation. Recommended not to take without medical advice. Risk Scoring- chronic illnesses and prescription drug management   Patient denies SI/HI/SIB. No evidence of AH/VH or delusions. Treatment Plan:  1.   Medications:          Medication Changes/Adjustments:  Continue Hydroxyzine 25 mg TID - 2 refills                                                               Discontinue Citalopram 20 mg daily - non compliance                                                               Discontinue Bupropion 300 mg XL- please take in morning- non compliance Current Outpatient Prescriptions   Medication Sig Dispense Refill    buPROPion XL (WELLBUTRIN XL) 300 mg XL tablet Take 1 Tab by mouth every morning. 30 Tab 0    hydrOXYzine HCl (ATARAX) 25 mg tablet Take 1 Tab by mouth every six (6) hours as needed for Anxiety. 120 Tab 0    citalopram (CELEXA) 20 mg tablet Please take half tablet daily x  5 days and then take 1 tablet daily 30 Tab 0    albuterol (PROVENTIL HFA, VENTOLIN HFA, PROAIR HFA) 90 mcg/actuation inhaler Take 2 Puffs by inhalation every four (4) hours as needed for Wheezing. Use with spacer. 1 Inhaler 2    beclomethasone (QVAR) 80 mcg/actuation inhaler Take 2 Puffs by inhalation daily. 8.7 g 5                  The following regarding medications was addressed:    (The risks and benefits of the proposed medication; the potential medication side effects ie    dry mouth, weight gain, GI upset, headache; patient given opportunity to ask questions)Pt was advised that studies have shown that use of Benzodiazepines over a  a long period of  time could predispose to dementia & memory loss . 2.  Counseling and coordination of care including instructions for treatment, risks/benefits, risk factor reduction and patient/family education. He agrees with the plan. Patient instructed to call with any side effects, questions or issues. Instructed patient to call the clinic, and if after hours call the provider on call ifclient experiences any suicidal thought or ideas to hurt self or other. Also instructed to call 911 or go to the ED. Patient verbalized understanding and agreed to call    3. Follow-up Disposition:  Return in about 3 months (around 11/29/2017) for med check and follow up. 4. Other: Nutritional/health counseling on diet and exercise. For reliable dietary information, go to www. [facility].org.   PSYCHOTHERAPY:  approx 16 minutes  Type:  Supportive/Cognitive Behavioral psychotherapy provided  Focus: Current problems- lives with sister, sister has marital issues. Housing issues- homeless   Occupational issues-  unemployed, SSI, not not willing to work. Legal issues- has charges pending r/t  property destruction   Interpersonal conflicts- ex-wife and mother    Psychoeducation provided  Treatment plan reviewed with patient-including diagnosis and medications    Worked on issues of denial & effects of substance dependency/use- caffeine and alcohol use, alprazolam    [facility] is not progressing.     Patrick Whitt NP  8/29/2017

## 2017-08-29 NOTE — PATIENT INSTRUCTIONS
For reliable dietary information, go to www. [employer]AT[employer].org. Sleep Tips    What to avoid    · Do not have drinks with caffeine, such as coffee or black tea, for 8 hours before bed. · Do not smoke or use other types of tobacco near bedtime. Nicotine is a stimulant and can keep you awake. · Avoid drinking alcohol late in the evening, because it can cause you to wake in the middle of the night. · Do not eat a big meal close to bedtime. If you are hungry, eat a light snack. · Do not drink a lot of water close to bedtime, because the need to urinate may wake you up during the night. · Do not read or watch TV in bed. Use the bed only for sleeping and sexual activity. What to try    · Go to bed at the same time every night, and wake up at the same time every morning. Do not take naps during the day. · Keep your bedroom quiet, dark, and cool. · Get regular exercise, but not within 3 to 4 hours of your bedtime. · Sleep on a comfortable pillow and mattress. · If watching the clock makes you anxious, turn it facing away from you so you cannot see the time. · If you worry when you lie down, start a worry book. Well before bedtime, write down your worries, and then set the book and your concerns aside. · Try meditation or other relaxation techniques before you go to bed. · If you cannot fall asleep, get up and go to another room until you feel sleepy. Do something relaxing. Repeat your bedtime routine before you go to bed again. Make your house quiet and calm about an hour before bedtime. Turn down the lights, turn off the TV, log off the computer, and turn down the volume on music. This can help you relax after a busy day. Anxiety Disorder: Care Instructions  Your Care Instructions  Anxiety is a normal reaction to stress. Difficult situations can cause you to have symptoms such as sweaty palms and a nervous feeling. In an anxiety disorder, the symptoms are far more severe.  Constant worry, muscle tension, trouble sleeping, nausea and diarrhea, and other symptoms can make normal daily activities difficult or impossible. These symptoms may occur for no reason, and they can affect your work, school, or social life. Medicines, counseling, and self-care can all help. Follow-up care is a key part of your treatment and safety. Be sure to make and go to all appointments, and call your doctor if you are having problems. It's also a good idea to know your test results and keep a list of the medicines you take. How can you care for yourself at home? · Take medicines exactly as directed. Call your doctor if you think you are having a problem with your medicine. · Go to your counseling sessions and follow-up appointments. · Recognize and accept your anxiety. Then, when you are in a situation that makes you anxious, say to yourself, \"This is not an emergency. I feel uncomfortable, but I am not in danger. I can keep going even if I feel anxious. \"  · Be kind to your body:  ¨ Relieve tension with exercise or a massage. ¨ Get enough rest.  ¨ Avoid alcohol, caffeine, nicotine, and illegal drugs. They can increase your anxiety level and cause sleep problems. ¨ Learn and do relaxation techniques. See below for more about these techniques. · Engage your mind. Get out and do something you enjoy. Go to a funny movie, or take a walk or hike. Plan your day. Having too much or too little to do can make you anxious. · Keep a record of your symptoms. Discuss your fears with a good friend or family member, or join a support group for people with similar problems. Talking to others sometimes relieves stress. · Get involved in social groups, or volunteer to help others. Being alone sometimes makes things seem worse than they are. · Get at least 30 minutes of exercise on most days of the week to relieve stress. Walking is a good choice.  You also may want to do other activities, such as running, swimming, cycling, or playing tennis or team sports. Relaxation techniques  Do relaxation exercises 10 to 20 minutes a day. You can play soothing, relaxing music while you do them, if you wish. · Tell others in your house that you are going to do your relaxation exercises. Ask them not to disturb you. · Find a comfortable place, away from all distractions and noise. · Lie down on your back, or sit with your back straight. · Focus on your breathing. Make it slow and steady. · Breathe in through your nose. Breathe out through either your nose or mouth. · Breathe deeply, filling up the area between your navel and your rib cage. Breathe so that your belly goes up and down. · Do not hold your breath. · Breathe like this for 5 to 10 minutes. Notice the feeling of calmness throughout your whole body. As you continue to breathe slowly and deeply, relax by doing the following for another 5 to 10 minutes:  · Tighten and relax each muscle group in your body. You can begin at your toes and work your way up to your head. · Imagine your muscle groups relaxing and becoming heavy. · Empty your mind of all thoughts. · Let yourself relax more and more deeply. · Become aware of the state of calmness that surrounds you. · When your relaxation time is over, you can bring yourself back to alertness by moving your fingers and toes and then your hands and feet and then stretching and moving your entire body. Sometimes people fall asleep during relaxation, but they usually wake up shortly afterward. · Always give yourself time to return to full alertness before you drive a car or do anything that might cause an accident if you are not fully alert. Never play a relaxation tape while you drive a car. When should you call for help? Call 911 anytime you think you may need emergency care. For example, call if:  · You feel you cannot stop from hurting yourself or someone else.   Keep the numbers for these national suicide hotlines: 3-874-257-TALK (6-413.417.9009) and 8-386-XYCPVPP (2-686-701-236-807-7964). If you or someone you know talks about suicide or feeling hopeless, get help right away. Watch closely for changes in your health, and be sure to contact your doctor if:  · You have anxiety or fear that affects your life. · You have symptoms of anxiety that are new or different from those you had before. Where can you learn more? Go to http://mazin-kenyatta.info/. Enter P754 in the search box to learn more about \"Anxiety Disorder: Care Instructions. \"  Current as of: July 26, 2016  Content Version: 11.3  © 5764-8841 [employer]. Care instructions adapted under license by [employer] (which disclaims liability or warranty for this information). If you have questions about a medical condition or this instruction, always ask your healthcare professional. [address] any warranty or liability for your use of this information.

## 2017-08-29 NOTE — MR AVS SNAPSHOT
Visit Information Date & Time Provider Department Dept. Phone Encounter #  
 8/29/2017  3:00 PM Elba Truong [facility] [phone] [id number] Follow-up Instructions Return in about 3 months (around 11/29/2017) for med check and follow up. Your Appointments 8/29/2017  3:00 PM  
ESTABLISHED PATIENT with Mark Domínguez NP [facility] ([facility]) Appt Note: 4 week follow-up; 08.17.17-left vm; 4 week follow-up; 08.28.17-left vm  
 [address] [place] [address]  
[phone]  
  
   
 [address] [facility] [address] [place] [id number]  
  
    
 11/16/2017 10:30 AM  
ESTABLISHED PATIENT with Mark Domínguez NP [facility] ([facility]) Appt Note: 3 month follow-up [address] [place] [address] [address] [place] [address]  
[phone] Upcoming Health Maintenance Date Due INFLUENZA AGE 9 TO ADULT 8/1/2017 DTaP/Tdap/Td series (2 - Td) 2/13/2027 Allergies as of 8/29/2017  Review Complete On: 8/29/2017 By: Mark Domínguez NP No Known Allergies Current Immunizations  Reviewed on 2/13/2017 No immunizations on file. Not reviewed this visit You Were Diagnosed With   
  
 Codes Comments Non compliance w medication regimen    -  Primary ICD-10-CM: Z91.14 
ICD-9-CM: V15.81 Mood disorder ([facility]     ICD-10-CM: F39 
ICD-9-CM: 296.90 Anxiety     ICD-10-CM: F41.9 ICD-9-CM: 300.00 Vitals Height(growth percentile) Smoking Status 6' 1\" (1.854 m) Current Every Day Smoker Preferred Pharmacy Pharmacy Name Phone [id number] [address] [phone] Your Updated Medication List  
  
   
This list is accurate as of: 8/29/17  2:50 PM.  Always use your most recent med list.  
  
  
  
  
 albuterol 90 mcg/actuation inhaler Commonly known as:  PROVENTIL HFA, VENTOLIN HFA, PROAIR HFA Take 2 Puffs by inhalation every four (4) hours as needed for Wheezing. Use with spacer. beclomethasone 80 mcg/actuation Flock[employer] Commonly known as:  QVAR Take 2 Puffs by inhalation daily. hydrOXYzine HCl 25 mg tablet Commonly known as:  ATARAX Take 1 Tab by mouth every six (6) hours as needed for Anxiety. Prescriptions Sent to Pharmacy Refills  
 hydrOXYzine HCl (ATARAX) 25 mg tablet 2 Sig: Take 1 Tab by mouth every six (6) hours as needed for Anxiety. Class: Normal  
 [facility] [address] [facility] #: [phone] Route: Oral  
  
Follow-up Instructions Return in about 3 months (around 11/29/2017) for med check and follow up. Patient Instructions For reliable dietary information, go to www. EATRIGHT.org. Sleep Tips What to avoid   
· Do not have drinks with caffeine, such as coffee or black tea, for 8 hours before bed. · Do not smoke or use other types of tobacco near bedtime. Nicotine is a stimulant and can keep you awake. · Avoid drinking alcohol late in the evening, because it can cause you to wake in the middle of the night. · Do not eat a big meal close to bedtime. If you are hungry, eat a light snack. · Do not drink a lot of water close to bedtime, because the need to urinate may wake you up during the night. · Do not read or watch TV in bed. Use the bed only for sleeping and sexual activity. What to try   
· Go to bed at the same time every night, and wake up at the same time every morning. Do not take naps during the day. · Keep your bedroom quiet, dark, and cool. · Get regular exercise, but not within 3 to 4 hours of your bedtime. · Sleep on a comfortable pillow and mattress. · If watching the clock makes you anxious, turn it facing away from you so you cannot see the time. · If you worry when you lie down, start a worry book. Well before bedtime, write down your worries, and then set the book and your concerns aside. · Try meditation or other relaxation techniques before you go to bed. · If you cannot fall asleep, get up and go to another room until you feel sleepy. Do something relaxing. Repeat your bedtime routine before you go to bed again. Make your house quiet and calm about an hour before bedtime. Turn down the lights, turn off the TV, log off the computer, and turn down the volume on music. This can help you relax after a busy day. Anxiety Disorder: Care Instructions Your Care Instructions Anxiety is a normal reaction to stress. Difficult situations can cause you to have symptoms such as sweaty palms and a nervous feeling. In an anxiety disorder, the symptoms are far more severe. Constant worry, muscle tension, trouble sleeping, nausea and diarrhea, and other symptoms can make normal daily activities difficult or impossible. These symptoms may occur for no reason, and they can affect your work, school, or social life. Medicines, counseling, and self-care can all help. Follow-up care is a key part of your treatment and safety. Be sure to make and go to all appointments, and call your doctor if you are having problems. It's also a good idea to know your test results and keep a list of the medicines you take. How can you care for yourself at home? · Take medicines exactly as directed. Call your doctor if you think you are having a problem with your medicine. · Go to your counseling sessions and follow-up appointments. · Recognize and accept your anxiety. Then, when you are in a situation that makes you anxious, say to yourself, \"This is not an emergency. I feel uncomfortable, but I am not in danger. I can keep going even if I feel anxious. \" · Be kind to your body: ¨ Relieve tension with exercise or a massage.  
¨ Get enough rest. 
 ¨ Avoid alcohol, caffeine, nicotine, and illegal drugs. They can increase your anxiety level and cause sleep problems. ¨ Learn and do relaxation techniques. See below for more about these techniques. · Engage your mind. Get out and do something you enjoy. Go to a funny movie, or take a walk or hike. Plan your day. Having too much or too little to do can make you anxious. · Keep a record of your symptoms. Discuss your fears with a good friend or family member, or join a support group for people with similar problems. Talking to others sometimes relieves stress. · Get involved in social groups, or volunteer to help others. Being alone sometimes makes things seem worse than they are. · Get at least 30 minutes of exercise on most days of the week to relieve stress. Walking is a good choice. You also may want to do other activities, such as running, swimming, cycling, or playing tennis or team sports. Relaxation techniques Do relaxation exercises 10 to 20 minutes a day. You can play soothing, relaxing music while you do them, if you wish. · Tell others in your house that you are going to do your relaxation exercises. Ask them not to disturb you. · Find a comfortable place, away from all distractions and noise. · Lie down on your back, or sit with your back straight. · Focus on your breathing. Make it slow and steady. · Breathe in through your nose. Breathe out through either your nose or mouth. · Breathe deeply, filling up the area between your navel and your rib cage. Breathe so that your belly goes up and down. · Do not hold your breath. · Breathe like this for 5 to 10 minutes. Notice the feeling of calmness throughout your whole body. As you continue to breathe slowly and deeply, relax by doing the following for another 5 to 10 minutes: · Tighten and relax each muscle group in your body. You can begin at your toes and work your way up to your head. · Imagine your muscle groups relaxing and becoming heavy. · Empty your mind of all thoughts. · Let yourself relax more and more deeply. · Become aware of the state of calmness that surrounds you. · When your relaxation time is over, you can bring yourself back to alertness by moving your fingers and toes and then your hands and feet and then stretching and moving your entire body. Sometimes people fall asleep during relaxation, but they usually wake up shortly afterward. · Always give yourself time to return to full alertness before you drive a car or do anything that might cause an accident if you are not fully alert. Never play a relaxation tape while you drive a car. When should you call for help? Call 911 anytime you think you may need emergency care. For example, call if: 
· You feel you cannot stop from hurting yourself or someone else. Keep the numbers for these national suicide hotlines: 2-185-383-TALK (6-306.717.2233) and 3-709-BFYFPRJ (2-660.319.1320). If you or someone you know talks about suicide or feeling hopeless, get help right away. Watch closely for changes in your health, and be sure to contact your doctor if: 
· You have anxiety or fear that affects your life. · You have symptoms of anxiety that are new or different from those you had before. Where can you learn more? Go to http://mazin-kenyatta.info/. Enter P754 in the search box to learn more about \"Anxiety Disorder: Care Instructions. \" Current as of: July 26, 2016 Content Version: 11.3 © 1524-4819 Healthwise, Incorporated. Care instructions adapted under license by [employer] (which disclaims liability or warranty for this information). If you have questions about a medical condition or this instruction, always ask your healthcare professional. [address] any warranty or liability for your use of this information. Introducing [facility]! Lakisha Nayak introduces [employer] patient portal. Now you can access parts of your medical record, email your doctor's office, and request medication refills online. 1. In your internet browser, go to [email or web address] 2. Click on the First Time User? Click Here link in the Sign In box. You will see the New Member Sign Up page. 3. Enter your [employer] Access Code exactly as it appears below. You will not need to use this code after youve completed the sign-up process. If you do not sign up before the expiration date, you must request a new code. · [employer] Access Code: [email or web address] Expires: 10/19/2017 11:04 AM 
 
4. Enter the last four digits of your Social Security Number (xxxx) and Date of Birth (mm/dd/yyyy) as indicated and click Submit. You will be taken to the next sign-up page. 5. Create a [employer] ID. This will be your [employer] login ID and cannot be changed, so think of one that is secure and easy to remember. 6. Create a [employer] password. You can change your password at any time. 7. Enter your Password Reset Question and Answer. This can be used at a later time if you forget your password. 8. Enter your e-mail address. You will receive e-mail notification when new information is available in [address] 9. Click Sign Up. You can now view and download portions of your medical record. 10. Click the Download Summary menu link to download a portable copy of your medical information. If you have questions, please visit the Frequently Asked Questions section of the [employer] website. Remember, [employer] is NOT to be used for urgent needs. For medical emergencies, dial 911. Now available from your iPhone and Android! Please provide this summary of care documentation to your next provider. Your primary care clinician is listed as [address] If you have any questions after today's visit, please call [phone].

## 2017-11-16 ENCOUNTER — OFFICE VISIT (OUTPATIENT)
Dept: BEHAVIORAL/MENTAL HEALTH CLINIC | Age: 32
End: 2017-11-16

## 2017-11-16 ENCOUNTER — DOCUMENTATION ONLY (OUTPATIENT)
Dept: BEHAVIORAL/MENTAL HEALTH CLINIC | Age: 32
End: 2017-11-16

## 2017-11-16 VITALS
HEIGHT: 73 IN | WEIGHT: 172 LBS | SYSTOLIC BLOOD PRESSURE: 124 MMHG | OXYGEN SATURATION: 97 % | HEART RATE: 83 BPM | DIASTOLIC BLOOD PRESSURE: 85 MMHG | BODY MASS INDEX: 22.8 KG/M2

## 2017-11-16 DIAGNOSIS — F43.22 ADJUSTMENT DISORDER WITH ANXIETY: Primary | ICD-10-CM

## 2017-11-16 DIAGNOSIS — F19.980: ICD-10-CM

## 2017-11-16 DIAGNOSIS — Z91.14 NON COMPLIANCE W MEDICATION REGIMEN: ICD-10-CM

## 2017-11-16 DIAGNOSIS — F41.9 ANXIETY: ICD-10-CM

## 2017-11-16 DIAGNOSIS — F10.10 ALCOHOL ABUSE, DAILY USE: ICD-10-CM

## 2017-11-16 RX ORDER — HYDROXYZINE 25 MG/1
25 TABLET, FILM COATED ORAL
Qty: 120 TAB | Refills: 3 | Status: SHIPPED | OUTPATIENT
Start: 2017-11-16 | End: 2018-04-17 | Stop reason: SDDI

## 2017-11-16 NOTE — PROGRESS NOTES
Psychiatric Outpatient Progress Note    Account Number:  [id number]  Name: Jose Griffith    SUBJECTIVE:   CHIEF COMPLAINT:  Jose Griffith is a 28 y.o. male and was seen today for follow-up of psychiatric condition and psychotropic medication management. He was seen with his counselor,  Latoyasummer Opal. HPI:    Sherri Jansen reports the following psychiatric symptoms:  depression and anxiety. H/o non compliance, substance use and alcohol use continuous. The symptoms have been present for few years and are of moderate  severity. The symptoms occur daily. Reported sleeping for 8-9hrs . Client reported energy level is fair, occasional irritability related to stressors only. Able to  focus and concentrate,  denied mood swings. Denied any Si or Hi or VAh or any hopelessness or helplesssenss or passive suicide thoughts. . Pt has five kids with 3 different women and now living with another woman for housing and has relationship issues and arguments. Contributing factors  include: has arguments with ex-wife, just released from prison, 13 driving offence, 2 felony , abduction and conspiracy charge- did 2 yrs of prison. Recent break up with girl friend, Ben Andersen time- 14 times. Financial , unemployed, unable to find job. Has court cases. Patient denies SI/HI/SIB. Side Effects:  none      Fam/Soc Hx (from [place] with updates):    Family History   Problem Relation Age of Onset    Asthma Mother       Social History   Substance Use Topics    Smoking status: Current Every Day Smoker     Packs/day: 0.25     Types: Cigars    Smokeless tobacco: Never Used    Alcohol use 0.0 oz/week     0 Standard drinks or equivalent per week      Comment: socially       REVIEW OF SYSTEMS:  Psychiatric:  depression, anxiety.   Appetite:no change from normal   Sleep: poor maintaining sleep                    Mental Status exam: WNL except for      Sensorium  oriented to time, place and person   Relations cooperative    Eye Contact    appropriate Appearance:  age appropriate, casually dressed, piercings and tattooed   Motor Behavior/Gait:  gait stable and within normal limits   Speech:  normal pitch and normal volume   Thought Process: goal directed, logical and within normal limits   Thought Content free of delusions and free of hallucinations   Suicidal ideations no plan , no intention and none   Homicidal ideations no plan , no intention and none   Mood:   anxious,   Affect:  anxious, irritable,and mood-congruent   Memory recent  adequate   Memory remote:  adequate   Concentration:  adequate   Abstraction:  abstract   Insight:  limited   Reliability fair   Judgment:  limited       MEDICAL DECISION MAKING  Data: pertinent labs, imaging, medical records and diagnostic tests reviewed and incorporated in diagnosis and treatment plan    No Known Allergies     Current Outpatient Prescriptions   Medication Sig Dispense Refill    hydrOXYzine HCl (ATARAX) 25 mg tablet Take 1 Tab by mouth every six (6) hours as needed for Anxiety. 120 Tab 2    albuterol (PROVENTIL HFA, VENTOLIN HFA, PROAIR HFA) 90 mcg/actuation inhaler Take 2 Puffs by inhalation every four (4) hours as needed for Wheezing. Use with spacer. 1 Inhaler 2    beclomethasone (QVAR) 80 mcg/actuation inhaler Take 2 Puffs by inhalation daily. 8.7 g 5        Visit Vitals    Ht 6' 1\" (1.854 m)    Wt 78 kg (172 lb)    BMI 22.69 kg/m2         Problems addressed today:  Mood disorder nos, adjustment disorder with anxiety and depression, caffeine use, alcohol use, nicotine use. Antisocial personality disorder    Assessment:    Mago Chacko  is a 28 y.o. Afro - American  male  is responding to treatment. He was late for the appointment. H/o learning disability and was in special school. Client has 5 kids from 1 women and is not paying child support and has housing issues, unemployed and is on SSI.    Client today reported that he has stopped taking depakote,lab work and is buying alprazolam from street and does not know the dosage. Reported he moved to new place with a friend and has arguments with her new room mate who lives with her ex-. He is in relationship with her. Reported hydroxyzine is benefiting and reported taking daily. Client reported taking hydroxyzine for insomnia and is benefiting with anxiety. Client reported energy level is fair, able to focus and concentrate. Client reported irritability and anxiety related to relationship stressors. He is not working. He stated today that he is not using xanax off the street. Reported relationship issues with his 3 ex-wifes and his mother. He has never paid child support. Reported sleeping for 8-9 hrs. He is drinking alcohol- liquor daily. Client does not exhibit or reported any symptoms of depression or mood. Client has stressors related to housing and new relationship. Plan to continue hydroxyzine as it is benefiting. Has medical h/o asthma. Educated on benzodiazepines use and it's  potential for tolerance as well as psychological addiction in some people,  generally for those with current substance abuse. Gave resources of substance use program.    Possible organic causes contributing are: Asthma  Risk Scoring- chronic illnesses and prescription drug management   Patient denies SI/HI/SIB. No evidence of AH/VH or delusions. Treatment Plan:  1. Medications:          Medication Changes/Adjustments:  Continue Hydroxyzine 25 mg TID - 3 refills                                                                    Lab ordered UDS                                                                  Current Outpatient Prescriptions   Medication Sig Dispense Refill    hydrOXYzine HCl (ATARAX) 25 mg tablet Take 1 Tab by mouth every six (6) hours as needed for Anxiety. 120 Tab 2    albuterol (PROVENTIL HFA, VENTOLIN HFA, PROAIR HFA) 90 mcg/actuation inhaler Take 2 Puffs by inhalation every four (4) hours as needed for Wheezing. Use with spacer.  1 Inhaler 2  beclomethasone (QVAR) 80 mcg/actuation inhaler Take 2 Puffs by inhalation daily. 8.7 g 5                  The following regarding medications was addressed:    (The risks and benefits of the proposed medication; the potential medication side effects ie    dry mouth, weight gain, GI upset, headache; patient given opportunity to ask questions)Pt was advised that studies have shown that use of Benzodiazepines over a  a long period of  time could predispose to dementia & memory loss . 2.  Counseling and coordination of care including instructions for treatment, risks/benefits, risk factor reduction and patient/family education. He agrees with the plan. Patient instructed to call with any side effects, questions or issues. Instructed patient to call the clinic, and if after hours call the provider on call ifclient experiences any suicidal thought or ideas to hurt self or other. Also instructed to call 911 or go to the ED. Patient verbalized understanding and agreed to call    3. Follow-up Disposition:  Return in about 6 months (around 5/16/2018) for med check and follow up. 4. Other: Nutritional/health counseling on diet and exercise. For reliable dietary information, go to www. EATRIGHT.org. PSYCHOTHERAPY:  approx 16 minutes  Type:  Supportive/Cognitive Behavioral psychotherapy provided  Focus:     Current problems- lives with sister, sister has marital issues. Housing issues- homeless   Occupational issues-  unemployed, SSI, not not willing to work. Legal issues- has charges pending r/t  property destruction   Interpersonal conflicts- ex-wife and mother    Psychoeducation provided  Treatment plan reviewed with patient-including diagnosis and medications    Worked on issues of denial & effects of substance dependency/use- caffeine and alcohol use, alprazolam    [place] is progressing.     Magda Rosenbaum NP  11/16/2017

## 2017-11-16 NOTE — PATIENT INSTRUCTIONS
Sleep Tips    What to avoid    · Do not have drinks with caffeine, such as coffee or black tea, for 8 hours before bed. · Do not smoke or use other types of tobacco near bedtime. Nicotine is a stimulant and can keep you awake. · Avoid drinking alcohol late in the evening, because it can cause you to wake in the middle of the night. · Do not eat a big meal close to bedtime. If you are hungry, eat a light snack. · Do not drink a lot of water close to bedtime, because the need to urinate may wake you up during the night. · Do not read or watch TV in bed. Use the bed only for sleeping and sexual activity. What to try    · Go to bed at the same time every night, and wake up at the same time every morning. Do not take naps during the day. · Keep your bedroom quiet, dark, and cool. · Get regular exercise, but not within 3 to 4 hours of your bedtime. · Sleep on a comfortable pillow and mattress. · If watching the clock makes you anxious, turn it facing away from you so you cannot see the time. · If you worry when you lie down, start a worry book. Well before bedtime, write down your worries, and then set the book and your concerns aside. · Try meditation or other relaxation techniques before you go to bed. · If you cannot fall asleep, get up and go to another room until you feel sleepy. Do something relaxing. Repeat your bedtime routine before you go to bed again. Make your house quiet and calm about an hour before bedtime. Turn down the lights, turn off the TV, log off the computer, and turn down the volume on music. This can help you relax after a busy day. Substance Abuse Resources    Alcoholics Anonymous Hotline (DN)                        #185-8369    Narcotics Anonymous Hotline (NA)   #4-159.448.3533    The [facility]   [address]   #[phone]   Http://www. Dely/index.htm    [facility])   [phone]      [address] [facility] Program   [phone]       [facility] [address]    The [place] [facility]   Admissions: [phone]    [facility]   [phone]    http://drugfreeva.org/fqfu-y-[email or web address]    Prashant Garsia- [address] street- [phone]    The daily planet- [place] [facility] [address] [place] [address] end - [address] [facility] end- [address] [facility]    Human resource [facility]. Methadone out patient clinic  [address] [facility] Mon, wed, Thursday- 8am - 12 noon.  Can walk in any day and speak with counsellor, bring picture ID    [place] start community-   Radha Allred [address] [place] [id number]- [place] lise based recovery model    ·

## 2017-11-16 NOTE — MR AVS SNAPSHOT
Visit Information Date & Time Provider Department Dept. Phone Encounter #  
 11/16/2017 10:30 AM Jose Hu, 81 [facility] [phone] [id number] Follow-up Instructions Return in about 4 months (around 3/16/2018) for med check and follow up. Upcoming Health Maintenance Date Due Influenza Age 5 to Adult 8/1/2017 DTaP/Tdap/Td series (2 - Td) 2/13/2027 Allergies as of 11/16/2017  Review Complete On: 11/16/2017 By: Chava Zheng CNA No Known Allergies Current Immunizations  Reviewed on 2/13/2017 No immunizations on file. Not reviewed this visit You Were Diagnosed With   
  
 Codes Comments Adjustment disorder with anxiety    -  Primary ICD-10-CM: U89.18 
ICD-9-CM: 309.24 Anxiety     ICD-10-CM: F41.9 ICD-9-CM: 300.00 Non compliance w medication regimen     ICD-10-CM: Z91.14 
ICD-9-CM: V15.81 Vitals BP Pulse Height(growth percentile) Weight(growth percentile) SpO2 BMI  
 124/85 (BP 1 Location: Left arm, BP Patient Position: Sitting) 83 6' 1\" (1.854 m) 172 lb (78 kg) 97% 22.69 kg/m2 Smoking Status Current Every Day Smoker Vitals History BMI and BSA Data Body Mass Index Body Surface Area  
 22.69 kg/m 2 2 m 2 Preferred Pharmacy Pharmacy Name Phone [id number] [phone] Your Updated Medication List  
  
   
This list is accurate as of: 11/16/17 11:21 AM.  Always use your most recent med list.  
  
  
  
  
 albuterol 90 mcg/actuation inhaler Commonly known as:  PROVENTIL HFA, VENTOLIN HFA, PROAIR HFA Take 2 Puffs by inhalation every four (4) hours as needed for Wheezing. Use with spacer. beclomethasone 80 mcg/actuation [employer] Commonly known as:  QVAR Take 2 Puffs by inhalation daily. hydrOXYzine HCl 25 mg tablet Commonly known as:  ATARAX Take 1 Tab by mouth every six (6) hours as needed for Anxiety. Prescriptions Sent to Pharmacy Refills  
 hydrOXYzine HCl (ATARAX) 25 mg tablet 3 Sig: Take 1 Tab by mouth every six (6) hours as needed for Anxiety. Class: Normal  
 [facility] Myriam Bennett [facility], [facility] #: [phone] Route: Oral  
  
Follow-up Instructions Return in about 4 months (around 3/16/2018) for med check and follow up. Patient Instructions Sleep Tips What to avoid   
· Do not have drinks with caffeine, such as coffee or black tea, for 8 hours before bed. · Do not smoke or use other types of tobacco near bedtime. Nicotine is a stimulant and can keep you awake. · Avoid drinking alcohol late in the evening, because it can cause you to wake in the middle of the night. · Do not eat a big meal close to bedtime. If you are hungry, eat a light snack. · Do not drink a lot of water close to bedtime, because the need to urinate may wake you up during the night. · Do not read or watch TV in bed. Use the bed only for sleeping and sexual activity. What to try   
· Go to bed at the same time every night, and wake up at the same time every morning. Do not take naps during the day. · Keep your bedroom quiet, dark, and cool. · Get regular exercise, but not within 3 to 4 hours of your bedtime. · Sleep on a comfortable pillow and mattress. · If watching the clock makes you anxious, turn it facing away from you so you cannot see the time. · If you worry when you lie down, start a worry book. Well before bedtime, write down your worries, and then set the book and your concerns aside. · Try meditation or other relaxation techniques before you go to bed. · If you cannot fall asleep, get up and go to another room until you feel sleepy. Do something relaxing. Repeat your bedtime routine before you go to bed again. Make your house quiet and calm about an hour before bedtime. Turn down the lights, turn off the TV, log off the computer, and turn down the volume on music. This can help you relax after a busy day. Substance Abuse Resources Alcoholics Anonymous Hotline ([place] [address])                      
 #573-9288 Narcotics Anonymous Hotline () 
 #6-672-620-104-319-0889 [facility] Baltazar Vergara [address] [place], [address] [facility] [address] #[phone] [email or web address]/[email or web address].htm [facility]) 
 [phone]      [address] [facility] 
 [phone] [place], [address] SABRA/Rafael Givens [facility] [place] [facility] Admissions: [phone] [facility] [phone] 
 
[email or web address] [place] [address] The daily planet- [facility] [address]- [phone] Local CSB's 
 [address] [facility]First [facility] end - [address] [facility] end- [address] [facility] resource [facility] Methadone out patient clinic [address] [facility] Mon, wed, Thursday- 8am - 12 noon. Can walk in any day and speak with counsellor, bring picture ID Edwar gautam [facility]-  
[facility] [address] [place] [address]- Congregational lise based recovery model · Introducing [facility] & HEALTH SERVICES! [facility] introduces [employer] patient portal. Now you can access parts of your medical record, email your doctor's office, and request medication refills online. 1. In your internet browser, go to [email or web address] 2. Click on the First Time User? Click Here link in the Sign In box. You will see the New Member Sign Up page. 3. Enter your [employer] Access Code exactly as it appears below. You will not need to use this code after youve completed the sign-up process. If you do not sign up before the expiration date, you must request a new code. · [employer] Access Code: [email or web address] Expires: 2/14/2018 11:06 AM 
 
4. Enter the last four digits of your Social Security Number (xxxx) and Date of Birth (mm/dd/yyyy) as indicated and click Submit. You will be taken to the next sign-up page. 5. Create a GetFresh ID. This will be your GetFresh login ID and cannot be changed, so think of one that is secure and easy to remember. 6. Create a GetFresh password. You can change your password at any time. 7. Enter your Password Reset Question and Answer. This can be used at a later time if you forget your password. 8. Enter your e-mail address. You will receive e-mail notification when new information is available in [address] 9. Click Sign Up. You can now view and download portions of your medical record. 10. Click the Download Summary menu link to download a portable copy of your medical information. If you have questions, please visit the Frequently Asked Questions section of the GetFresh website. Remember, Get[employer] is NOT to be used for urgent needs. For medical emergencies, dial 911. Now available from your iPhone and Android! Please provide this summary of care documentation to your next provider. Your primary care clinician is listed as [address] If you have any questions after today's visit, please call [phone].

## 2018-03-13 ENCOUNTER — OFFICE VISIT (OUTPATIENT)
Dept: INTERNAL MEDICINE CLINIC | Age: 33
End: 2018-03-13

## 2018-03-16 ENCOUNTER — OFFICE VISIT (OUTPATIENT)
Dept: INTERNAL MEDICINE CLINIC | Age: 33
End: 2018-03-16

## 2018-03-16 ENCOUNTER — HOSPITAL ENCOUNTER (OUTPATIENT)
Dept: LAB | Age: 33
Discharge: HOME | End: 2018-03-16
Payer: MEDICARE

## 2018-03-16 VITALS
HEART RATE: 72 BPM | TEMPERATURE: 97.8 F | SYSTOLIC BLOOD PRESSURE: 107 MMHG | OXYGEN SATURATION: 97 % | WEIGHT: 184.2 LBS | BODY MASS INDEX: 24.41 KG/M2 | DIASTOLIC BLOOD PRESSURE: 69 MMHG | HEIGHT: 73 IN | RESPIRATION RATE: 18 BRPM

## 2018-03-16 DIAGNOSIS — J30.9 ALLERGIC RHINITIS, UNSPECIFIED CHRONICITY, UNSPECIFIED SEASONALITY, UNSPECIFIED TRIGGER: ICD-10-CM

## 2018-03-16 DIAGNOSIS — Z00.00 WELL ADULT EXAM: ICD-10-CM

## 2018-03-16 DIAGNOSIS — Z00.00 ROUTINE MEDICAL EXAM: Primary | ICD-10-CM

## 2018-03-16 DIAGNOSIS — L98.9 SKIN LESION OF LEFT LEG: ICD-10-CM

## 2018-03-16 DIAGNOSIS — J45.909 UNCOMPLICATED ASTHMA, UNSPECIFIED ASTHMA SEVERITY, UNSPECIFIED WHETHER PERSISTENT: ICD-10-CM

## 2018-03-16 DIAGNOSIS — F90.9 ATTENTION DEFICIT HYPERACTIVITY DISORDER (ADHD), UNSPECIFIED ADHD TYPE: ICD-10-CM

## 2018-03-16 DIAGNOSIS — Z11.3 SCREENING FOR STD (SEXUALLY TRANSMITTED DISEASE): ICD-10-CM

## 2018-03-16 DIAGNOSIS — M25.562 ACUTE PAIN OF LEFT KNEE: ICD-10-CM

## 2018-03-16 PROCEDURE — 80061 LIPID PANEL: CPT

## 2018-03-16 PROCEDURE — 85025 COMPLETE CBC W/AUTO DIFF WBC: CPT

## 2018-03-16 PROCEDURE — 87591 N.GONORRHOEAE DNA AMP PROB: CPT

## 2018-03-16 PROCEDURE — 87340 HEPATITIS B SURFACE AG IA: CPT

## 2018-03-16 PROCEDURE — 87389 HIV-1 AG W/HIV-1&-2 AB AG IA: CPT

## 2018-03-16 PROCEDURE — 84550 ASSAY OF BLOOD/URIC ACID: CPT

## 2018-03-16 PROCEDURE — 86780 TREPONEMA PALLIDUM: CPT

## 2018-03-16 PROCEDURE — 80053 COMPREHEN METABOLIC PANEL: CPT

## 2018-03-16 PROCEDURE — 86803 HEPATITIS C AB TEST: CPT

## 2018-03-16 RX ORDER — ALBUTEROL SULFATE 90 UG/1
2 AEROSOL, METERED RESPIRATORY (INHALATION)
Qty: 1 INHALER | Refills: 2 | Status: SHIPPED | OUTPATIENT
Start: 2018-03-16 | End: 2018-05-29 | Stop reason: SDUPTHER

## 2018-03-16 RX ORDER — DEXTROAMPHETAMINE SACCHARATE, AMPHETAMINE ASPARTATE MONOHYDRATE, DEXTROAMPHETAMINE SULFATE AND AMPHETAMINE SULFATE 7.5; 7.5; 7.5; 7.5 MG/1; MG/1; MG/1; MG/1
30 CAPSULE, EXTENDED RELEASE ORAL DAILY
Qty: 30 CAP | Refills: 0 | Status: SHIPPED | OUTPATIENT
Start: 2018-03-16 | End: 2018-04-14

## 2018-03-16 RX ORDER — DEXTROAMPHETAMINE SACCHARATE, AMPHETAMINE ASPARTATE MONOHYDRATE, DEXTROAMPHETAMINE SULFATE AND AMPHETAMINE SULFATE 7.5; 7.5; 7.5; 7.5 MG/1; MG/1; MG/1; MG/1
30 CAPSULE, EXTENDED RELEASE ORAL DAILY
Qty: 30 CAP | Refills: 0 | Status: SHIPPED | OUTPATIENT
Start: 2018-04-15 | End: 2018-05-14

## 2018-03-16 RX ORDER — DEXTROAMPHETAMINE SACCHARATE, AMPHETAMINE ASPARTATE MONOHYDRATE, DEXTROAMPHETAMINE SULFATE AND AMPHETAMINE SULFATE 7.5; 7.5; 7.5; 7.5 MG/1; MG/1; MG/1; MG/1
30 CAPSULE, EXTENDED RELEASE ORAL DAILY
Qty: 30 CAP | Refills: 0 | Status: SHIPPED | OUTPATIENT
Start: 2018-05-15 | End: 2018-06-13

## 2018-03-16 NOTE — PROGRESS NOTES
Subjective:   Mary Welch is a 28 y.o. male presenting for his annual checkup. ROS:   No dyspnea or chest pain on exertion. No abdominal pain, change in bowel habits, black or bloody stools. No urinary tract or prostatic symptoms. No neurological complaints. Specific concerns today:   See other. Past medical, Social, and Family history reviewed  Medications reviewed and updated. Objective:     Visit Vitals    /69 (BP 1 Location: Left arm, BP Patient Position: Sitting)    Pulse 72    Temp 97.8 °F (36.6 °C) (Oral)    Resp 18    Ht 6' 1\" (1.854 m)    Wt 184 lb 3.2 oz (83.6 kg)    SpO2 97%    BMI 24.3 kg/m2     The patient appears well, alert, oriented x 3, in no distress. ENT normal.  Neck supple. No adenopathy or thyromegaly. MIKE. Lungs are clear, good air entry, no wheezes, rhonchi or rales. S1 and S2 normal, no murmurs, regular rate and rhythm. Abdomen is soft without tenderness, guarding, mass or organomegaly.  exam: deferred. Extremities show no edema, normal peripheral pulses. Neurological is normal without focal findings. Prior labs reviewed. Assessment/Plan:   healthy adult male  follow low fat diet, routine labs ordered, call if any problems. ICD-10-CM ICD-9-CM    1. Routine medical exam Z00.00 V70.0    2. Attention deficit hyperactivity disorder (ADHD), unspecified ADHD type F90.9 314.01 amphetamine-dextroamphetamine XR (ADDERALL XR) 30 mg XR capsule      amphetamine-dextroamphetamine XR (ADDERALL XR) 30 mg XR capsule      amphetamine-dextroamphetamine XR (ADDERALL XR) 30 mg XR capsule   3. Allergic rhinitis, unspecified chronicity, unspecified seasonality, unspecified trigger J30.9 477.9    4. Uncomplicated asthma, unspecified asthma severity, unspecified whether persistent J45.909 493.90 albuterol (PROVENTIL HFA, VENTOLIN HFA, PROAIR HFA) 90 mcg/actuation inhaler   5.  Skin lesion of left leg L98.9 709.9 REFERRAL TO DERMATOLOGY   6. Screening for STD (sexually transmitted disease) Z11.3 V74.5 HCV AB W/RFLX TO MARISSA      HIV 1/2 AG/AB, 4TH GENERATION,W RFLX CONFIRM      CT/NG/T.VAGINALIS AMPLIFICATION      T PALLIDUM AB      HEP B SURFACE AG   7. Well adult exam Z00.00 V70.0 CBC WITH AUTOMATED DIFF      LIPID PANEL      METABOLIC PANEL, COMPREHENSIVE   8. Acute pain of left knee M25.562 719.46 URIC ACID      XR KNEE LT MAX 2 VWS     Follow-up Disposition:  Return in about 3 months (around 6/16/2018), or if symptoms worsen or fail to improve, for ADHD. results and schedule of future studies reviewed with patient  reviewed diet, exercise and weight   very strongly urged to quit smoking to reduce cardiovascular risk  cardiovascular risk and specific lipid/LDL goals reviewed  reviewed medications and side effects in detail.   Pt declines Tdap

## 2018-03-16 NOTE — PROGRESS NOTES
HPI:  Presents for f/u ADHD, etc    Pt reports mood is stable - off meds at present  Pt reports improved environment and social support have stabilized his mood    But, pt finds poor focus and lack of attention a limitation to work productivity and trouble maintaining a job.    +mild increase in resp sx in recent weeks    +smoking   Trying to quit by using vapors, ecigs    Left knee pain  No known injury    Left leg skin lesion  Chronic, at time painful if bumped into    midchest discomfort transient  Not exertional  exac by right arm movements  +hx doing push ups regularly    Pt seen [facility] for dental pain in the past 3 weeks  Dx dental infection  Rx'd abx + ibuprofen  Better at this point  No dentist.      Past medical, Social, and Family history reviewed    Prior to Admission medications    Medication Sig Start Date End Date Taking? Authorizing Provider   hydrOXYzine HCl (ATARAX) 25 mg tablet Take 1 Tab by mouth every six (6) hours as needed for Anxiety. 11/16/17  Yes Maliha Clarke NP   albuterol (PROVENTIL HFA, VENTOLIN HFA, PROAIR HFA) 90 mcg/actuation inhaler Take 2 Puffs by inhalation every four (4) hours as needed for Wheezing. Use with spacer. 2/13/17  Yes Robson Babcock MD   beclomethasone (QVAR) 80 mcg/actuation inhaler Take 2 Puffs by inhalation daily. 2/13/17  Yes Robson Babcock MD          ROS  Complete ROS reviewed and negative or stable except as noted in HPI. Physical Exam   Constitutional: He is oriented to person, place, and time. He appears well-nourished. No distress. HENT:   Head: Normocephalic and atraumatic. Mouth/Throat: Oropharynx is clear and moist.   Eyes: EOM are normal. Pupils are equal, round, and reactive to light. No scleral icterus. Neck: Normal range of motion. Neck supple. Cardiovascular: Normal rate, regular rhythm and normal heart sounds. Exam reveals no gallop and no friction rub. No murmur heard.   Pulmonary/Chest: Effort normal and breath sounds normal. No respiratory distress. He has no wheezes. He has no rales. Abdominal: Soft. He exhibits no distension. There is no tenderness. Genitourinary:   Genitourinary Comments: Deferred   Musculoskeletal: Normal range of motion. He exhibits no edema. Neurological: He is alert and oriented to person, place, and time. He exhibits normal muscle tone. Skin: Skin is warm. No rash noted. Psychiatric: He has a normal mood and affect. Nursing note and vitals reviewed. Prior labs reviewed. Assessment/Plan:    ICD-10-CM ICD-9-CM    1. Attention deficit hyperactivity disorder (ADHD), unspecified ADHD type F90.9 314.01 amphetamine-dextroamphetamine XR (ADDERALL XR) 30 mg XR capsule      amphetamine-dextroamphetamine XR (ADDERALL XR) 30 mg XR capsule      amphetamine-dextroamphetamine XR (ADDERALL XR) 30 mg XR capsule   2. Allergic rhinitis, unspecified chronicity, unspecified seasonality, unspecified trigger J30.9 477.9    3. Uncomplicated asthma, unspecified asthma severity, unspecified whether persistent J45.909 493.90 albuterol (PROVENTIL HFA, VENTOLIN HFA, PROAIR HFA) 90 mcg/actuation inhaler   4. Skin lesion of left leg L98.9 709.9 REFERRAL TO DERMATOLOGY   5. Screening for STD (sexually transmitted disease) Z11.3 V74.5 HCV AB W/RFLX TO MARISSA      HIV 1/2 AG/AB, 4TH GENERATION,W RFLX CONFIRM      CT/NG/T.VAGINALIS AMPLIFICATION      T PALLIDUM AB      HEP B SURFACE AG   6. Well adult exam Z00.00 V70.0 CBC WITH AUTOMATED DIFF      LIPID PANEL      METABOLIC PANEL, COMPREHENSIVE   7. Acute pain of left knee M25.562 719.46 URIC ACID      XR KNEE LT MAX 2 VWS   8. Routine medical exam Z00.00 V70.0      Follow-up Disposition:  Return in about 3 months (around 6/16/2018), or if symptoms worsen or fail to improve, for ADHD.   results and schedule of future studies reviewed with patient  reviewed diet, exercise and weight   cardiovascular risk and specific lipid/LDL goals reviewed  reviewed medications and side effects in detail   Refill adderall XR at 30mg (prior dose)  Smoking cessation discussed  Knee Xray  Uric acid  Encouraged dental eval - ie [facility]

## 2018-03-16 NOTE — MR AVS SNAPSHOT
[address] [address] [place] [address] 
[phone] Patient: German Lainez MRN: [id number] XOE:[birth year] Visit Information Date & Time Provider Department Dept. Phone Encounter #  
 3/16/2018  8:45 AM Castillo aWng, [address] [place] [address] and Internal Medicine [phone] Follow-up Instructions Return in about 3 months (around 6/16/2018), or if symptoms worsen or fail to improve, for ADHD. Your Appointments 4/17/2018  1:00 PM  
ESTABLISHED PATIENT with Ean Peterson NP [facility] ([address] [facility]) Appt Note: 4 month follow-up; 3.12.18-spoke to mother to confirm; 4 month follow-up; r/s from 3.14; 4 month follow-up;r/s from 4.3  
 [address] [place] [address] Ibrahima Bennie Shawon 178  
  
   
 [address] [address] Upcoming Health Maintenance Date Due DTaP/Tdap/Td series (2 - Td) 3/16/2028 Allergies as of 3/16/2018  Review Complete On: 3/16/2018 By: Castillo Wang MD  
 No Known Allergies Current Immunizations  Reviewed on 3/16/2018 No immunizations on file. Reviewed by Castillo Wang MD on 3/16/2018 at  9:32 AM  
You Were Diagnosed With   
  
 Codes Comments Attention deficit hyperactivity disorder (ADHD), unspecified ADHD type    -  Primary ICD-10-CM: F90.9 ICD-9-CM: 314.01 Allergic rhinitis, unspecified chronicity, unspecified seasonality, unspecified trigger     ICD-10-CM: J30.9 ICD-9-CM: 477.9 Uncomplicated asthma, unspecified asthma severity, unspecified whether persistent     ICD-10-CM: J45.909 ICD-9-CM: 493.90 Skin lesion of left leg     ICD-10-CM: L98.9 ICD-9-CM: 709.9 Screening for STD (sexually transmitted disease)     ICD-10-CM: Z11.3 ICD-9-CM: V74.5 Well adult exam     ICD-10-CM: Z00.00 ICD-9-CM: V70.0 Acute pain of left knee     ICD-10-CM: M25.562 ICD-9-CM: 719.46 Routine medical exam     ICD-10-CM: Z00.00 ICD-9-CM: V70.0 Vitals BP Pulse Temp Resp Height(growth percentile) Weight(growth percentile) 107/69 (BP 1 Location: Left arm, BP Patient Position: Sitting) 72 97.8 °F (36.6 °C) (Oral) 18 6' 1\" (1.854 m) 184 lb 3.2 oz (83.6 kg) SpO2 BMI Smoking Status 97% 24.3 kg/m2 Current Every Day Smoker Vitals History BMI and BSA Data Body Mass Index Body Surface Area  
 24.3 kg/m 2 2.08 m 2 Preferred Pharmacy Pharmacy Name Phone [id number]- [facility] Jareth Janice 23 [phone] Your Updated Medication List  
  
   
This list is accurate as of 3/16/18  9:34 AM.  Always use your most recent med list.  
  
  
  
  
 albuterol 90 mcg/actuation inhaler Commonly known as:  PROVENTIL HFA, VENTOLIN HFA, PROAIR HFA Take 2 Puffs by inhalation every four (4) hours as needed for Wheezing. Use with spacer. * amphetamine-dextroamphetamine XR 30 mg XR capsule Commonly known as:  ADDERALL XR Take 1 Cap (30 mg total) by mouth dailyEarliest Fill Date: 3/16/18. Max Daily Amount: 30 mg  
  
 * amphetamine-dextroamphetamine XR 30 mg XR capsule Commonly known as:  ADDERALL XR Take 1 Cap (30 mg total) by mouth dailyEarliest Fill Date: 4/15/18. Max Daily Amount: 30 mg  
Start taking on:  4/15/2018 * amphetamine-dextroamphetamine XR 30 mg XR capsule Commonly known as:  ADDERALL XR Take 1 Cap (30 mg total) by mouth dailyEarliest Fill Date: 5/15/18. Max Daily Amount: 30 mg  
Start taking on:  5/15/2018  
  
 beclomethasone 80 mcg/actuation [employer] Commonly known as:  QVAR Take 2 Puffs by inhalation daily. hydrOXYzine HCl 25 mg tablet Commonly known as:  ATARAX Take 1 Tab by mouth every six (6) hours as needed for Anxiety. * Notice:   This list has 3 medication(s) that are the same as other medications prescribed for you. Read the directions carefully, and ask your doctor or other care provider to review them with you. Prescriptions Printed Refills  
 amphetamine-dextroamphetamine XR (ADDERALL XR) 30 mg XR capsule 0 Sig: Take 1 Cap (30 mg total) by mouth dailyEarliest Fill Date: 3/16/18. Max Daily Amount: 30 mg  
 Class: Print Route: Oral  
 amphetamine-dextroamphetamine XR (ADDERALL XR) 30 mg XR capsule 0 Starting on: 4/15/2018 Sig: Take 1 Cap (30 mg total) by mouth dailyEarliest Fill Date: 4/15/18. Max Daily Amount: 30 mg  
 Class: Print Route: Oral  
 amphetamine-dextroamphetamine XR (ADDERALL XR) 30 mg XR capsule 0 Starting on: 5/15/2018 Sig: Take 1 Cap (30 mg total) by mouth dailyEarliest Fill Date: 5/15/18. Max Daily Amount: 30 mg  
 Class: Print Route: Oral  
  
Prescriptions Sent to Pharmacy Refills  
 albuterol (PROVENTIL HFA, VENTOLIN HFA, PROAIR HFA) 90 mcg/actuation inhaler 2 Sig: Take 2 Puffs by inhalation every four (4) hours as needed for Wheezing. Use with spacer. Class: Normal  
 Pharmacy: [facility]/pharmacy [facility] 73, [facility] Ph #: [phone] Route: Inhalation We Performed the Following CBC WITH AUTOMATED DIFF [64120 CPT(R)] CT/NG/T.VAGINALIS AMPLIFICATION [id number] CPT(R)] HCV AB W/RFLX TO MARISSA [59758 CPT(R)] HEP B SURFACE AG Z6780411 CPT(R)] HIV 1/2 AG/AB, 4TH GENERATION,W RFLX CONFIRM V1070098 CPT(R)] LIPID PANEL [88509 CPT(R)] METABOLIC PANEL, COMPREHENSIVE [92978 CPT(R)] REFERRAL TO DERMATOLOGY [REF19 Custom] T PALLIDUM AB [[id number] Custom] URIC ACID Z8273113 CPT(R)] Follow-up Instructions Return in about 3 months (around 6/16/2018), or if symptoms worsen or fail to improve, for ADHD. To-Do List   
 03/16/2018 Imaging:  XR KNEE LT MAX 2 VWS Referral Information Referral ID Referred By Referred To  
  
 [id number] Ximena NORIEGA MD   
   [facility] [address] [place] [address] Phone: [phone] Fax: [phone] Visits Status Start Date End Date 1 Authorized 3/16/18 3/16/19 If your referral has a status of pending review or denied, additional information will be sent to support the outcome of this decision. Introducing [facility] & HEALTH SERVICES! Ricki Hinkle introduces [employer] patient portal. Now you can access parts of your medical record, email your doctor's office, and request medication refills online. 1. In your internet browser, go to [email or web address]. Natanael Ulien/[facility] 2. Click on the First Time User? Click Here link in the Sign In box. You will see the New Member Sign Up page. 3. Enter your Mojostreet Access Code exactly as it appears below. You will not need to use this code after youve completed the sign-up process. If you do not sign up before the expiration date, you must request a new code. · Moj[employer] Access Code: [email or web address] Expires: 6/11/2018  4:23 PM 
 
4. Enter the last four digits of your Social Security Number (xxxx) and Date of Birth (mm/dd/yyyy) as indicated and click Submit. You will be taken to the next sign-up page. 5. Create a Mojostreet ID. This will be your Mojostreet login ID and cannot be changed, so think of one that is secure and easy to remember. 6. Create a Mo[employer]reet password. You can change your password at any time. 7. Enter your Password Reset Question and Answer. This can be used at a later time if you forget your password. 8. Enter your e-mail address. You will receive e-mail notification when new information is available in [address] 9. Click Sign Up. You can now view and download portions of your medical record. 10. Click the Download Summary menu link to download a portable copy of your medical information. If you have questions, please visit the Frequently Asked Questions section of the [employer] website. Remember, [employer] is NOT to be used for urgent needs. For medical emergencies, dial 911. Now available from your iPhone and Android! Please provide this summary of care documentation to your next provider. Your primary care clinician is listed as [address] If you have any questions after today's visit, please call [phone].

## 2018-03-16 NOTE — PROGRESS NOTES
Exam Room [address] [place] [address] is a 28 y.o. male  Chief Complaint   Patient presents with    Physical     Per patient     1. Have you been to the ER, urgent care clinic since your last visit? Hospitalized since your last visit? Yes When: 3/2018 Where: [facility] Reason for visit: Tooth pain    2. Have you seen or consulted any other health care providers outside of the [address] [place] [address] since your last visit? Include any pap smears or colon screening. No    There are no preventive care reminders to display for this patient.

## 2018-03-19 LAB
C TRACH RRNA SPEC QL NAA+PROBE: NEGATIVE
N GONORRHOEA RRNA SPEC QL NAA+PROBE: NEGATIVE
T VAGINALIS RRNA SPEC QL NAA+PROBE: NEGATIVE

## 2018-03-20 LAB
ALBUMIN SERPL-MCNC: 4.4 G/DL (ref 3.5–5.5)
ALBUMIN/GLOB SERPL: 1.6 {RATIO} (ref 1.2–2.2)
ALP SERPL-CCNC: 82 IU/L (ref 39–117)
ALT SERPL-CCNC: 25 IU/L (ref 0–44)
AST SERPL-CCNC: 19 IU/L (ref 0–40)
BASOPHILS # BLD AUTO: 0.1 X10E3/UL (ref 0–0.2)
BASOPHILS NFR BLD AUTO: 1 %
BILIRUB SERPL-MCNC: 0.3 MG/DL (ref 0–1.2)
BUN SERPL-MCNC: 15 MG/DL (ref 6–20)
BUN/CREAT SERPL: 16 (ref 9–20)
CALCIUM SERPL-MCNC: 9.6 MG/DL (ref 8.7–10.2)
CHLORIDE SERPL-SCNC: 99 MMOL/L (ref 96–106)
CHOLEST SERPL-MCNC: 158 MG/DL (ref 100–199)
CO2 SERPL-SCNC: 27 MMOL/L (ref 18–29)
CREAT SERPL-MCNC: 0.93 MG/DL (ref 0.76–1.27)
EOSINOPHIL # BLD AUTO: 0.7 X10E3/UL (ref 0–0.4)
EOSINOPHIL NFR BLD AUTO: 8 %
ERYTHROCYTE [DISTWIDTH] IN BLOOD BY AUTOMATED COUNT: 12.9 % (ref 12.3–15.4)
GLOBULIN SER CALC-MCNC: 2.8 G/DL (ref 1.5–4.5)
GLUCOSE SERPL-MCNC: 93 MG/DL (ref 65–99)
HBV SURFACE AG SERPL QL IA: NEGATIVE
HCT VFR BLD AUTO: 47.8 % (ref 37.5–51)
HCV AB S/CO SERPL IA: 0.2 S/CO RATIO (ref 0–0.9)
HCV AB SERPL QL IA: NORMAL
HDLC SERPL-MCNC: 45 MG/DL
HGB BLD-MCNC: 15.9 G/DL (ref 13–17.7)
HIV 1+2 AB+HIV1 P24 AG SERPL QL IA: NON REACTIVE
IMM GRANULOCYTES # BLD: 0 X10E3/UL (ref 0–0.1)
IMM GRANULOCYTES NFR BLD: 0 %
LDLC SERPL CALC-MCNC: 93 MG/DL (ref 0–99)
LYMPHOCYTES # BLD AUTO: 2.4 X10E3/UL (ref 0.7–3.1)
LYMPHOCYTES NFR BLD AUTO: 28 %
MCH RBC QN AUTO: 29.3 PG (ref 26.6–33)
MCHC RBC AUTO-ENTMCNC: 33.3 G/DL (ref 31.5–35.7)
MCV RBC AUTO: 88 FL (ref 79–97)
MONOCYTES # BLD AUTO: 0.7 X10E3/UL (ref 0.1–0.9)
MONOCYTES NFR BLD AUTO: 8 %
NEUTROPHILS # BLD AUTO: 4.8 X10E3/UL (ref 1.4–7)
NEUTROPHILS NFR BLD AUTO: 55 %
PLATELET # BLD AUTO: 229 X10E3/UL (ref 150–379)
POTASSIUM SERPL-SCNC: 4.2 MMOL/L (ref 3.5–5.2)
PROT SERPL-MCNC: 7.2 G/DL (ref 6–8.5)
RBC # BLD AUTO: 5.43 X10E6/UL (ref 4.14–5.8)
SODIUM SERPL-SCNC: 142 MMOL/L (ref 134–144)
T PALLIDUM AB SER QL IA: NEGATIVE
TRIGL SERPL-MCNC: 98 MG/DL (ref 0–149)
URATE SERPL-MCNC: 5.2 MG/DL (ref 3.7–8.6)
VLDLC SERPL CALC-MCNC: 20 MG/DL (ref 5–40)
WBC # BLD AUTO: 8.7 X10E3/UL (ref 3.4–10.8)

## 2018-04-17 ENCOUNTER — OFFICE VISIT (OUTPATIENT)
Dept: BEHAVIORAL/MENTAL HEALTH CLINIC | Age: 33
End: 2018-04-17

## 2018-04-17 VITALS
WEIGHT: 181 LBS | SYSTOLIC BLOOD PRESSURE: 117 MMHG | BODY MASS INDEX: 23.99 KG/M2 | HEIGHT: 73 IN | HEART RATE: 68 BPM | DIASTOLIC BLOOD PRESSURE: 50 MMHG

## 2018-04-17 DIAGNOSIS — F43.23 ADJUSTMENT DISORDER WITH MIXED ANXIETY AND DEPRESSED MOOD: ICD-10-CM

## 2018-04-17 DIAGNOSIS — Z78.9 ALCOHOL USE: ICD-10-CM

## 2018-04-17 DIAGNOSIS — Z78.9 CAFFEINE USE: ICD-10-CM

## 2018-04-17 NOTE — PROGRESS NOTES
Psychiatric Outpatient Progress Note    Account Number:  [id number]  Name: Aydee Pierre    SUBJECTIVE:   CHIEF COMPLAINT:  Aydee Pierre is a 35 y.o. male and was seen today for follow-up of psychiatric condition and psychotropic medication management. HPI:    Radhahilary Wolfcurtdarnell reports the following psychiatric symptoms:  depression and anxiety. H/o non compliance, substance use and alcohol use continuous. H/o learning disability and was in special school. Client has 5 kids from 1 women and is not paying child support and has housing issues, unemployed and is on SSI. Has been non compliant with his psychotropics, lab work and was buying alprazolam from [employer]. Today he reported that his mood is getting better. He is receiving adderall from his PCP, Dr. Arjun Mendez and he takes it to help with exercise at gym. Reported has never done neuropsychological testing. He is looking for part time job. Reported \"life is coming better\". Reported has interest, has motivation, has energy and able to focus and concentrate. Denied any anxiety and does not require to take hydroxyzine. Reported sleeping for 8-9 hrs . The symptoms have been present for few years and are stable now. Denied any SI or HI or VAH or any hopelessness or helplesssenss or passive suicide thoughts. Contributing factors/life events:  released from snf, 13 driving offence, 2 felony , abduction and conspiracy charge- did 2 yrs of snf. Recent break up with girl friend, Anshul davis- 14 times. Financial , unemployed, unable to find job. Has court cases. Patient denies SI/HI/SIB.      Side Effects:  none      Fam/Soc Hx (from [facility] with updates):    Family History   Problem Relation Age of Onset    Asthma Mother       Social History   Substance Use Topics    Smoking status: Current Every Day Smoker     Packs/day: 0.25     Types: Cigars    Smokeless tobacco: Never Used    Alcohol use 0.0 oz/week     0 Standard drinks or equivalent per week      Comment: socially       REVIEW OF SYSTEMS:  Psychiatric:  depression, anxiety. Appetite:no change from normal   Sleep: poor maintaining sleep                    Mental Status exam: WNL except for      Sensorium  oriented to time, place and person   Relations cooperative    Eye Contact    appropriate   Appearance:  age appropriate, casually dressed, piercings and tattooed   Motor Behavior/Gait:  gait stable and within normal limits   Speech:  normal pitch and normal volume   Thought Process: goal directed, logical and within normal limits   Thought Content free of delusions and free of hallucinations   Suicidal ideations no plan , no intention and none   Homicidal ideations no plan , no intention and none   Mood:   euthymic   Affect:  euthymic,and mood-congruent   Memory recent  adequate   Memory remote:  adequate   Concentration:  adequate   Abstraction:  abstract   Insight:  limited   Reliability fair   Judgment:  limited       MEDICAL DECISION MAKING  Data: pertinent labs, imaging, medical records and diagnostic tests reviewed and incorporated in diagnosis and treatment plan    No Known Allergies     Current Outpatient Prescriptions   Medication Sig Dispense Refill    amphetamine-dextroamphetamine XR (ADDERALL XR) 30 mg XR capsule Take 1 Cap (30 mg total) by mouth dailyEarliest Fill Date: 4/15/18. Max Daily Amount: 30 mg 30 Cap 0    [START ON 5/15/2018] amphetamine-dextroamphetamine XR (ADDERALL XR) 30 mg XR capsule Take 1 Cap (30 mg total) by mouth dailyEarliest Fill Date: 5/15/18. Max Daily Amount: 30 mg 30 Cap 0    albuterol (PROVENTIL HFA, VENTOLIN HFA, PROAIR HFA) 90 mcg/actuation inhaler Take 2 Puffs by inhalation every four (4) hours as needed for Wheezing. Use with spacer. 1 Inhaler 2    hydrOXYzine HCl (ATARAX) 25 mg tablet Take 1 Tab by mouth every six (6) hours as needed for Anxiety. 120 Tab 3    beclomethasone (QVAR) 80 mcg/actuation inhaler Take 2 Puffs by inhalation daily.  8.7 g 5        Visit Vitals  Ht 6' 1\" (1.854 m)    Wt 82.1 kg (181 lb)    BMI 23.88 kg/m2         Problems addressed today:  adjustment disorder with anxiety and depression, caffeine use, alcohol use, nicotine use. Antisocial personality disorder    Assessment:    Griselda Daniels  is a 35 y.o. Afro - American  male  is responding to treatment. Reported today that he is receiving adderall from his PCP since past 3-4 months. Reported he has never done neuropsychological testing. He is unemployed and not in school. He is taking adderall to exercise at gym. Reported his life situation has improved and feels stable. He is looking for a part time job. Denied any symptoms of depression or anxiety or any psychosis or mood symptoms. Reported not taking hydroxyzine since last 5-6 months. Reported has housing now and lives with room mates. Denied any substance use. Drinks alcohol occasionally. Reported mood is 'good' has energy, interest and motivation and able to focus and concentrate. Reported sleeping for 8-9 hrs. Client does not exhibit or reported any symptoms of depression or mood. Reported has decreased alcohol and caffeine use. Advised to stop using alcohol. Plan to discontinue hydroxyzine. He was informed that he cannot receive treatment from two providers. Patient stated that he wish to continue receiving psychotropics from his PCP. He does not have to follow up with this provider. He is discharged from this practice. He was informed that we will be available for providing psychiatric serives if needed in future. Possible organic causes contributing are: Asthma  Risk Scoring- chronic illnesses and prescription drug management   Patient denies SI/HI/SIB. No evidence of AH/VH or delusions. Treatment Plan:  1.   Medications:          Medication Changes/Adjustments:  Discontinue Hydroxyzine 25 mg TID - Current Outpatient Prescriptions   Medication Sig Dispense Refill    amphetamine-dextroamphetamine XR (ADDERALL XR) 30 mg XR capsule Take 1 Cap (30 mg total) by mouth dailyEarliest Fill Date: 4/15/18. Max Daily Amount: 30 mg 30 Cap 0    [START ON 5/15/2018] amphetamine-dextroamphetamine XR (ADDERALL XR) 30 mg XR capsule Take 1 Cap (30 mg total) by mouth dailyEarliest Fill Date: 5/15/18. Max Daily Amount: 30 mg 30 Cap 0    albuterol (PROVENTIL HFA, VENTOLIN HFA, PROAIR HFA) 90 mcg/actuation inhaler Take 2 Puffs by inhalation every four (4) hours as needed for Wheezing. Use with spacer. 1 Inhaler 2    hydrOXYzine HCl (ATARAX) 25 mg tablet Take 1 Tab by mouth every six (6) hours as needed for Anxiety. 120 Tab 3    beclomethasone (QVAR) 80 mcg/actuation inhaler Take 2 Puffs by inhalation daily. 8.7 g 5                  The following regarding medications was addressed:    (The risks and benefits of the proposed medication; the potential medication side effects ie    dry mouth, weight gain, GI upset, headache; patient given opportunity to ask questions). 2.  Counseling and coordination of care including instructions for treatment, risks/benefits, risk factor reduction and patient/family education. He agrees with the plan. Patient instructed to call with any side effects, questions or issues. Instructed patient to call the clinic, and if after hours call the provider on call ifclient experiences any suicidal thought or ideas to hurt self or other. Also instructed to call 911 or go to the ED. Patient verbalized understanding and agreed to call    3. Follow-up Disposition: Not on File    4. Other: Nutritional/health counseling on diet and exercise. For reliable dietary information, go to www. EATRIGHT.org.   PSYCHOTHERAPY:  approx 10-16 minutes  Type:  Supportive/Cognitive Behavioral psychotherapy provided  Focus:     Current problems- looking for a job   Housing issues- lives with room mate   Occupational issues-  unemployed, SSI,                 Psychoeducation provided  Treatment plan reviewed with patient-including diagnosis and medications    Worked on issues of denial & effects of substance dependency/use- caffeine and alcohol use-  Reported reduced alcohol and caffeine    W[place]ley is progressing.     Balbina Alvarez NP  4/17/2018

## 2018-04-17 NOTE — MR AVS SNAPSHOT
[address] [address] 
[phone] Patient: Stacey Smith MRN: [id number] JKI:[birth year] Visit Information Date & Time Provider Department Dept. Phone Encounter #  
 4/17/2018  1:00 PM [facility] [phone] [id number] Upcoming Health Maintenance Date Due  
 MEDICARE YEARLY EXAM 3/27/2018 DTaP/Tdap/Td series (2 - Td) 3/16/2028 Allergies as of 4/17/2018  Review Complete On: 4/17/2018 By: Cardoz, NP No Known Allergies Current Immunizations  Reviewed on 3/16/2018 No immunizations on file. Not reviewed this visit Vitals BP Pulse Height(growth percentile) Weight(growth percentile) BMI Smoking Status 117/50 68 6' 1\" (1.854 m) 181 lb (82.1 kg) 23.88 kg/m2 Current Every Day Smoker Vitals History BMI and BSA Data Body Mass Index Body Surface Area  
 23.88 kg/m 2 2.06 m 2 [facility] Pharm[facility] Phone [id number] [phone] Your Updated Medication List  
  
   
This list is accurate as of 4/17/18  1:26 PM.  Always use your most recent med list.  
  
  
  
  
 albuterol 90 mcg/actuation inhaler Commonly known as:  PROVENTIL HFA, VENTOLIN HFA, PROAIR HFA Take 2 Puffs by inhalation every four (4) hours as needed for Wheezing. Use with spacer. * amphetamine-dextroamphetamine XR 30 mg XR capsule Commonly known as:  ADDERALL XR Take 1 Cap (30 mg total) by mouth dailyEarliest Fill Date: 4/15/18. Max Daily Amount: 30 mg  
  
 * amphetamine-dextroamphetamine XR 30 mg XR capsule Commonly known as:  ADDERALL XR Take 1 Cap (30 mg total) by mouth dailyEarliest Fill Date: 5/15/18. Max Daily Amount: 30 mg  
Start taking on:  5/15/2018  
  
 beclomethasone 80 mcg/actuation Aero Commonly known as:  QVAR  
 Take 2 Puffs by inhalation daily. hydrOXYzine HCl 25 mg tablet Commonly known as:  ATARAX Take 1 Tab by mouth every six (6) hours as needed for Anxiety. * Notice: This list has 2 medication(s) that are the same as other medications prescribed for you. Read the directions carefully, and ask your doctor or other care provider to review them with you. Introducing [facility] & HEALTH SERVICES! [facility] introduces [employer] patient portal. Now you can access parts of your medical record, email your doctor's office, and request medication refills online. 1. In your internet browser, go to [email or web address] 2. Click on the First Time User? Click Here link in the Sign In box. You will see the New Member Sign Up page. 3. Enter your SpaBoom Access Code exactly as it appears below. You will not need to use this code after youve completed the sign-up process. If you do not sign up before the expiration date, you must request a new code. · SpaBoom Access Code: [email or web address] Expires: 6/11/2018  4:23 PM 
 
4. Enter the last four digits of your Social Security Number (xxxx) and Date of Birth (mm/dd/yyyy) as indicated and click Submit. You will be taken to the next sign-up page. 5. Create a SpaBoom ID. This will be your SpaBoom login ID and cannot be changed, so think of one that is secure and easy to remember. 6. Create a SpaBoom password. You can change your password at any time. 7. Enter your Password Reset Question and Answer. This can be used at a later time if you forget your password. 8. Enter your e-mail address. You will receive e-mail notification when new information is available in [address] 9. Click Sign Up. You can now view and download portions of your medical record. 10. Click the Download Summary menu link to download a portable copy of your medical information.  
 
If you have questions, please visit the Frequently Asked Questions section of the [employer]. Remember, [employer] is NOT to be used for urgent needs. For medical emergencies, dial 911. Now available from your iPhone and Android! Please provide this summary of care documentation to your next provider. Your primary care clinician is listed as [address] If you have any questions after today's visit, please call [phone].

## 2018-05-29 ENCOUNTER — OFFICE VISIT (OUTPATIENT)
Dept: INTERNAL MEDICINE CLINIC | Age: 33
End: 2018-05-29

## 2018-05-29 VITALS
OXYGEN SATURATION: 98 % | HEART RATE: 83 BPM | BODY MASS INDEX: 24.52 KG/M2 | HEIGHT: 73 IN | RESPIRATION RATE: 18 BRPM | SYSTOLIC BLOOD PRESSURE: 131 MMHG | TEMPERATURE: 98.2 F | WEIGHT: 185 LBS | DIASTOLIC BLOOD PRESSURE: 88 MMHG

## 2018-05-29 DIAGNOSIS — F31.81 BIPOLAR 2 DISORDER (HCC): ICD-10-CM

## 2018-05-29 DIAGNOSIS — J45.909 UNCOMPLICATED ASTHMA, UNSPECIFIED ASTHMA SEVERITY, UNSPECIFIED WHETHER PERSISTENT: ICD-10-CM

## 2018-05-29 DIAGNOSIS — J30.9 ALLERGIC RHINITIS, UNSPECIFIED SEASONALITY, UNSPECIFIED TRIGGER: ICD-10-CM

## 2018-05-29 DIAGNOSIS — L98.9 SKIN LESION OF LEFT LEG: ICD-10-CM

## 2018-05-29 DIAGNOSIS — R06.00 DYSPNEA, UNSPECIFIED TYPE: Primary | ICD-10-CM

## 2018-05-29 DIAGNOSIS — F90.9 ATTENTION DEFICIT HYPERACTIVITY DISORDER (ADHD), UNSPECIFIED ADHD TYPE: ICD-10-CM

## 2018-05-29 RX ORDER — ALBUTEROL SULFATE 0.83 MG/ML
2.5 SOLUTION RESPIRATORY (INHALATION)
Qty: 1 EACH | Refills: 4 | Status: SHIPPED | OUTPATIENT
Start: 2018-05-29 | End: 2022-04-29 | Stop reason: SDUPTHER

## 2018-05-29 RX ORDER — PREDNISONE 10 MG/1
TABLET ORAL
Qty: 21 TAB | Refills: 0 | Status: SHIPPED | OUTPATIENT
Start: 2018-05-29 | End: 2019-03-25

## 2018-05-29 RX ORDER — MONTELUKAST SODIUM 10 MG/1
10 TABLET ORAL DAILY
Qty: 30 TAB | Refills: 5 | Status: SHIPPED | OUTPATIENT
Start: 2018-05-29 | End: 2020-08-08 | Stop reason: SDUPTHER

## 2018-05-29 RX ORDER — FLUTICASONE PROPIONATE AND SALMETEROL 250; 50 UG/1; UG/1
1 POWDER RESPIRATORY (INHALATION) 2 TIMES DAILY
Qty: 1 INHALER | Refills: 5 | Status: SHIPPED | OUTPATIENT
Start: 2018-05-29 | End: 2019-10-14 | Stop reason: SDUPTHER

## 2018-05-29 RX ORDER — ALBUTEROL SULFATE 90 UG/1
2 AEROSOL, METERED RESPIRATORY (INHALATION)
Qty: 1 INHALER | Refills: 2 | Status: SHIPPED | OUTPATIENT
Start: 2018-05-29 | End: 2019-10-14 | Stop reason: SDUPTHER

## 2018-05-29 NOTE — MR AVS SNAPSHOT
[address] [address] [place] [address] 
[phone] Patient: Jaime Steiner MRN: [id number] QIM:[birth year] Visit Information Date & Time Provider Department Dept. Phone Encounter #  
 5/29/2018  1:30 PM Mikey Cerda, [address] [place] [address] and Internal Medicine [phone] [id number] Follow-up Instructions Return in about 2 months (around 7/29/2018), or if symptoms worsen or fail to improve, for asthma. Upcoming Health Maintenance Date Due  
 MEDICARE YEARLY EXAM 3/27/2018 Influenza Age 5 to Adult 8/1/2018 DTaP/Tdap/Td series (2 - Td) 3/16/2028 Allergies as of 5/29/2018  Review Complete On: 5/29/2018 By: Mikey Cerda MD  
 No Known Allergies Current Immunizations  Reviewed on 3/16/2018 No immunizations on file. Not reviewed this visit You Were Diagnosed With   
  
 Codes Comments Dyspnea, unspecified type    -  Primary ICD-10-CM: R06.00 
ICD-9-CM: 786.09 Uncomplicated asthma, unspecified asthma severity, unspecified whether persistent     ICD-10-CM: J45.909 ICD-9-CM: 493.90 Allergic rhinitis, unspecified seasonality, unspecified trigger     ICD-10-CM: J30.9 ICD-9-CM: 477.9 Attention deficit hyperactivity disorder (ADHD), unspecified ADHD type     ICD-10-CM: F90.9 ICD-9-CM: 314.01 Skin lesion of left leg     ICD-10-CM: L98.9 ICD-9-CM: 709.9 Bipolar 2 disorder (HCC)     ICD-10-CM: F31.81 
ICD-9-CM: 296.89 Vitals BP Pulse Temp Resp Height(growth percentile) Weight(growth percentile) 131/88 (BP 1 Location: Right arm, BP Patient Position: Sitting) 83 98.2 °F (36.8 °C) (Oral) 18 6' 1\" (1.854 m) 185 lb (83.9 kg) SpO2 BMI Smoking Status 98% 24.41 kg/m2 Current Every Day Smoker Vitals History BMI and BSA Data Body Mass Index Body Surface Area  
 24.41 kg/m 2 2.08 m 2 Preferred Pharmacy Pharmacy Name Phone [id number] [address] [phone] Your Updated Medication List  
  
   
This list is accurate as of 5/29/18  2:08 PM.  Always use your most recent med list.  
  
  
  
  
 * albuterol 2.5 mg /3 mL (0.083 %) nebulizer solution Commonly known as:  PROVENTIL VENTOLIN  
3 mL by Nebulization route every four (4) hours as needed for Wheezing. * albuterol 90 mcg/actuation inhaler Commonly known as:  PROVENTIL HFA, VENTOLIN HFA, PROAIR HFA Take 2 Puffs by inhalation every four (4) hours as needed for Wheezing. Use with spacer. amphetamine-dextroamphetamine XR 30 mg XR capsule Commonly known as:  ADDERALL XR Take 1 Cap (30 mg total) by mouth dailyEarliest Fill Date: 5/15/18. Max Daily Amount: 30 mg  
  
 fluticasone-salmeterol 250-50 mcg/dose diskus inhaler Commonly known as:  ADVAIR Take 1 Puff by inhalation two (2) times a day. montelukast 10 mg tablet Commonly known as:  SINGULAIR Take 1 Tab by mouth daily. predniSONE 10 mg tablet Commonly known as:  Adrienne Smoker Taper daily. 60mg x1, 50mg x 1, 40mg x 1, 30mg x 1, 20mg x 1, 10mg x 1  
  
 * Notice: This list has 2 medication(s) that are the same as other medications prescribed for you. Read the directions carefully, and ask your doctor or other care provider to review them with you. Prescriptions Sent to Pharmacy Refills  
 albuterol (PROVENTIL VENTOLIN) 2.5 mg /3 mL (0.083 %) nebulizer solution 4 Sig: 3 mL by Nebulization route every four (4) hours as needed for Wheezing. Class: Normal  
 Pharmacy: [facility]/pharmacy Myriam Choe, [address] Ph #: [phone] Route: Nebulization  
 albuterol (PROVENTIL HFA, VENTOLIN HFA, PROAIR HFA) 90 mcg/actuation inhaler 2 Sig: Take 2 Puffs by inhalation every four (4) hours as needed for Wheezing. Use with spacer. Class: Normal  
 Pharmacy: [address] Ph #: [phone] Route: Inhalation  
 fluticasone-salmeterol (ADVAIR) 250-50 mcg/dose diskus inhaler 5 Sig: Take 1 Puff by inhalation two (2) times a day. Class: Normal  
 Pharmacy: [address] Ph #: [phone] Route: Inhalation  
 predniSONE (DELTASONE) 10 mg tablet 0 Sig: Taper daily. 60mg x1, 50mg x 1, 40mg x 1, 30mg x 1, 20mg x 1, 10mg x 1 Class: Normal  
 [facility] [address] Ph #: [phone]  
 montelukast (SINGULAIR) 10 mg tablet 5 Sig: Take 1 Tab by mouth daily. Class: Normal  
 Pharmacy: [address] Ph #: [phone] Route: Oral  
  
Follow-up Instructions Return in about 2 months (around 7/29/2018), or if symptoms worsen or fail to improve, for asthma. Introducing [facility] & HEALTH SERVICES! [employer] introduces [employer] patient portal. Now you can access parts of your medical record, email your doctor's office, and request medication refills online. 1. In your internet browser, go to [email or web address] 2. Click on the First Time User? Click Here link in the Sign In box. You will see the New Member Sign Up page. 3. Enter your [employer] Access Code exactly as it appears below. You will not need to use this code after youve completed the sign-up process. If you do not sign up before the expiration date, you must request a new code. · [employer] Access Code: [email or web address] Expires: 6/11/2018  4:23 PM 
 
4. Enter the last four digits of your Social Security Number (xxxx) and Date of Birth (mm/dd/yyyy) as indicated and click Submit. You will be taken to the next sign-up page. 5. Create a [employer] ID. This will be your [employer] login ID and cannot be changed, so think of one that is secure and easy to remember. 6. Create a [employer] password. You can change your password at any time. 7. Enter your Password Reset Question and Answer. This can be used at a later time if you forget your password. 8. Enter your e-mail address. You will receive e-mail notification when new information is available in [address] 9. Click Sign Up. You can now view and download portions of your medical record. 10. Click the Download Summary menu link to download a portable copy of your medical information. If you have questions, please visit the Frequently Asked Questions section of the [employer] website. Remember, [employer] is NOT to be used for urgent needs. For medical emergencies, dial 911. Now available from your iPhone and Android! Please provide this summary of care documentation to your next provider. Your primary care clinician is listed as [address] If you have any questions after today's visit, please call [phone].

## 2018-05-29 NOTE — PROGRESS NOTES
Exam Room [address] [address] is a 35 y.o. male  Chief Complaint   Patient presents with    Respiratory Distress     at night     1. Have you been to the ER, urgent care clinic since your last visit? Hospitalized since your last visit? No    2. Have you seen or consulted any other health care providers outside of the [address] [place] [address] since your last visit? Include any pap smears or colon screening.  No    Health Maintenance Due   Topic Date Due    MEDICARE YEARLY EXAM  03/27/2018

## 2018-05-29 NOTE — PROGRESS NOTES
HPI:  Presents for f/u resp symptoms    Pt reports SOB - worse at night and in the AM  Lesser symptoms throughout the day  +mucous secretions  A little worse when he smokes cigarettes. Pt inquires re: a nebulizer machine. Prior nebulized albuterol use had been helpful  Pt reports less benefit from MDI than from nebulizer    Pt filled a single adderall XR Rx  Reports it helps when takes it. Pt reports he did not have a ride to the pharmacy to fill the other 2 Rx's  But, still has the other 2 Rx's    Past medical, Social, and Family history reviewed    Prior to Admission medications    Medication Sig Start Date End Date Taking? Authorizing Provider   amphetamine-dextroamphetamine XR (ADDERALL XR) 30 mg XR capsule Take 1 Cap (30 mg total) by mouth dailyEarliest Fill Date: 5/15/18. Max Daily Amount: 30 mg 5/15/18 6/13/18 Yes Roxanne Grant MD   albuterol (PROVENTIL HFA, VENTOLIN HFA, PROAIR HFA) 90 mcg/actuation inhaler Take 2 Puffs by inhalation every four (4) hours as needed for Wheezing. Use with spacer. 3/16/18  Yes Roxanne Grant MD   beclomethasone (QVAR) 80 mcg/actuation inhaler Take 2 Puffs by inhalation daily. 2/13/17  Yes Roxanne Grant MD          ROS  Complete ROS reviewed and negative or stable except as noted in HPI. Physical Exam   Constitutional: He is oriented to person, place, and time. He appears well-nourished. No distress. HENT:   Head: Normocephalic and atraumatic. Mouth/Throat: Oropharynx is clear and moist.   Eyes: EOM are normal. Pupils are equal, round, and reactive to light. No scleral icterus. Neck: Normal range of motion. Neck supple. No JVD present. Cardiovascular: Normal rate, regular rhythm and normal heart sounds. Exam reveals no gallop and no friction rub. No murmur heard. Pulmonary/Chest: Effort normal. No respiratory distress. He has wheezes (scant end exp with forced exp). He has no rales. Prolonged exp phase   Abdominal: Soft.  He exhibits no distension. There is no tenderness. Musculoskeletal: Normal range of motion. He exhibits no edema. Lymphadenopathy:     He has no cervical adenopathy. Neurological: He is alert and oriented to person, place, and time. He exhibits normal muscle tone. Skin: Skin is warm. No rash noted. Psychiatric: He has a normal mood and affect. Nursing note and vitals reviewed. Prior labs reviewed.  reviewed - only the 3/2018 Rx for adderall XR has been filled. Assessment/Plan:    ICD-10-CM ICD-9-CM    1. Dyspnea, unspecified type R06.00 786.09    2. Uncomplicated asthma, unspecified asthma severity, unspecified whether persistent J45.909 493.90 albuterol (PROVENTIL VENTOLIN) 2.5 mg /3 mL (0.083 %) nebulizer solution      albuterol (PROVENTIL HFA, VENTOLIN HFA, PROAIR HFA) 90 mcg/actuation inhaler      fluticasone-salmeterol (ADVAIR) 250-50 mcg/dose diskus inhaler      predniSONE (DELTASONE) 10 mg tablet      montelukast (SINGULAIR) 10 mg tablet   3. Allergic rhinitis, unspecified seasonality, unspecified trigger J30.9 477.9    4. Attention deficit hyperactivity disorder (ADHD), unspecified ADHD type F90.9 314.01    5. Skin lesion of left leg L98.9 709.9    6. Bipolar 2 disorder ([facility] F31.81 296.89      Follow-up Disposition:  Return in about 2 months (around 7/29/2018), or if symptoms worsen or fail to improve, for asthma. results and schedule of future studies reviewed with patient  reviewed diet, exercise and weight   very strongly urged to quit smoking to reduce cardiovascular and pulmonary risk reviewed medications and side effects in detail   Arrange for nebulizer  Refill albuterol   Add singulair   advair   pred taper  Encouraged to see Derm surgeon to eval and consider removal of leg lesion as indicated.

## 2018-08-27 ENCOUNTER — OFFICE VISIT (OUTPATIENT)
Dept: INTERNAL MEDICINE CLINIC | Age: 33
End: 2018-08-27

## 2018-08-27 VITALS
DIASTOLIC BLOOD PRESSURE: 85 MMHG | SYSTOLIC BLOOD PRESSURE: 129 MMHG | OXYGEN SATURATION: 98 % | RESPIRATION RATE: 18 BRPM | HEIGHT: 73 IN | TEMPERATURE: 98.3 F | HEART RATE: 72 BPM | WEIGHT: 187.4 LBS | BODY MASS INDEX: 24.84 KG/M2

## 2018-08-27 DIAGNOSIS — M79.671 RIGHT FOOT PAIN: ICD-10-CM

## 2018-08-27 DIAGNOSIS — J45.909 UNCOMPLICATED ASTHMA, UNSPECIFIED ASTHMA SEVERITY, UNSPECIFIED WHETHER PERSISTENT: ICD-10-CM

## 2018-08-27 DIAGNOSIS — J30.9 ALLERGIC RHINITIS, UNSPECIFIED SEASONALITY, UNSPECIFIED TRIGGER: ICD-10-CM

## 2018-08-27 DIAGNOSIS — F90.9 ATTENTION DEFICIT HYPERACTIVITY DISORDER (ADHD), UNSPECIFIED ADHD TYPE: ICD-10-CM

## 2018-08-27 DIAGNOSIS — S92.901D CLOSED FRACTURE OF RIGHT FOOT WITH ROUTINE HEALING, SUBSEQUENT ENCOUNTER: Primary | ICD-10-CM

## 2018-08-27 RX ORDER — NALOXONE HYDROCHLORIDE 4 MG/.1ML
SPRAY NASAL
Qty: 2 EACH | Refills: 1 | Status: SHIPPED | OUTPATIENT
Start: 2018-08-27 | End: 2019-03-25

## 2018-08-27 RX ORDER — DEXTROAMPHETAMINE SACCHARATE, AMPHETAMINE ASPARTATE MONOHYDRATE, DEXTROAMPHETAMINE SULFATE AND AMPHETAMINE SULFATE 7.5; 7.5; 7.5; 7.5 MG/1; MG/1; MG/1; MG/1
30 CAPSULE, EXTENDED RELEASE ORAL DAILY
Qty: 30 CAP | Refills: 0 | Status: SHIPPED | OUTPATIENT
Start: 2018-08-27 | End: 2018-09-25

## 2018-08-27 RX ORDER — DEXTROAMPHETAMINE SACCHARATE, AMPHETAMINE ASPARTATE MONOHYDRATE, DEXTROAMPHETAMINE SULFATE AND AMPHETAMINE SULFATE 7.5; 7.5; 7.5; 7.5 MG/1; MG/1; MG/1; MG/1
30 CAPSULE, EXTENDED RELEASE ORAL DAILY
Qty: 30 CAP | Refills: 0 | Status: SHIPPED | OUTPATIENT
Start: 2018-10-26 | End: 2018-11-24

## 2018-08-27 RX ORDER — DEXTROAMPHETAMINE SACCHARATE, AMPHETAMINE ASPARTATE MONOHYDRATE, DEXTROAMPHETAMINE SULFATE AND AMPHETAMINE SULFATE 7.5; 7.5; 7.5; 7.5 MG/1; MG/1; MG/1; MG/1
30 CAPSULE, EXTENDED RELEASE ORAL DAILY
Qty: 30 CAP | Refills: 0 | Status: SHIPPED | OUTPATIENT
Start: 2018-09-26 | End: 2018-10-25

## 2018-08-27 RX ORDER — OXYCODONE HYDROCHLORIDE 10 MG/1
10 TABLET ORAL
Qty: 28 TAB | Refills: 0 | Status: SHIPPED | OUTPATIENT
Start: 2018-08-27 | End: 2019-03-25

## 2018-08-27 NOTE — MR AVS SNAPSHOT
[address]  [address] [place] [address] 
[phone] Patient: Reza Vazquez MRN: [id number] PUW:[id number] Visit Information Date & Time Provider Department Dept. Phone Encounter #  
 8/27/2018  4:15 PM [facility] [phone] [id number] Follow-up Instructions Return in about 3 months (around 11/27/2018), or if symptoms worsen or fail to improve, for ADHD, asthma. Upcoming Health Maintenance Date Due Influenza Age 5 to Adult 8/1/2018 MEDICARE YEARLY EXAM 8/23/2018 DTaP/Tdap/Td series (2 - Td) 3/16/2028 Allergies as of 8/27/2018  Review Complete On: 8/27/2018 By: Desirae Reaves MD  
 No Known Allergies Current Immunizations  Reviewed on 3/16/2018 No immunizations on file. Not reviewed this visit You Were Diagnosed With   
  
 Codes Comments Closed fracture of right foot with routine healing, subsequent encounter    -  Primary ICD-10-CM: S92.901D ICD-9-CM: V54.16 Right foot pain     ICD-10-CM: M79.671 ICD-9-CM: 729.5 Attention deficit hyperactivity disorder (ADHD), unspecified ADHD type     ICD-10-CM: F90.9 ICD-9-CM: 314.01 Uncomplicated asthma, unspecified asthma severity, unspecified whether persistent     ICD-10-CM: J45.909 ICD-9-CM: 493.90 Allergic rhinitis, unspecified seasonality, unspecified trigger     ICD-10-CM: J30.9 ICD-9-CM: 477.9 Vitals BP Pulse Temp Resp Height(growth percentile) Weight(growth percentile) 129/85 (BP 1 Location: Right arm, BP Patient Position: Sitting) 72 98.3 °F (36.8 °C) (Oral) 18 6' 1\" (1.854 m) 187 lb 6.4 oz (85 kg) SpO2 BMI Smoking Status 98% 24.72 kg/m2 Current Every Day Smoker Vitals History BMI and BSA Data Body Mass Index Body Surface Area 24.72 kg/m 2 2.09 m 2 Preferred Pharmacy Pharmacy Name Phone [id number] Ty [address] [phone] Your Updated Medication List  
  
   
This list is accurate as of 8/27/18  5:40 PM.  Always use your most recent med list.  
  
  
  
  
 * albuterol 2.5 mg /3 mL (0.083 %) nebulizer solution Commonly known as:  PROVENTIL VENTOLIN  
3 mL by Nebulization route every four (4) hours as needed for Wheezing. * albuterol 90 mcg/actuation inhaler Commonly known as:  PROVENTIL HFA, VENTOLIN HFA, PROAIR HFA Take 2 Puffs by inhalation every four (4) hours as needed for Wheezing. Use with spacer. * amphetamine-dextroamphetamine XR 30 mg XR capsule Commonly known as:  ADDERALL XR Take 1 Cap (30 mg total) by mouth dailyEarliest Fill Date: 8/27/18. Max Daily Amount: 30 mg  
  
 * amphetamine-dextroamphetamine XR 30 mg XR capsule Commonly known as:  ADDERALL XR Take 1 Cap (30 mg total) by mouth dailyEarliest Fill Date: 9/26/18. Max Daily Amount: 30 mg  
Start taking on:  9/26/2018 * amphetamine-dextroamphetamine XR 30 mg XR capsule Commonly known as:  ADDERALL XR Take 1 Cap (30 mg total) by mouth dailyEarliest Fill Date: 10/26/18. Max Daily Amount: 30 mg  
Start taking on:  10/26/2018  
  
 fluticasone-salmeterol 250-50 mcg/dose diskus inhaler Commonly known as:  ADVAIR Take 1 Puff by inhalation two (2) times a day. montelukast 10 mg tablet Commonly known as:  SINGULAIR Take 1 Tab by mouth daily. naloxone 4 mg/actuation nasal spray Commonly known as:  [employer] Use 1 spray intranasally, then discard. Repeat with new spray every 2 min as needed for opioid overdose symptoms, alternating nostrils. oxyCODONE IR 10 mg Tab immediate release tablet Commonly known as:  [employer] Take 1 Tab by mouth every six (6) hours as needed. Max Daily Amount: 40 mg.  
  
 predniSONE 10 mg tablet Commonly known as:  Robyn Means  
 Taper daily. 60mg x1, 50mg x 1, 40mg x 1, 30mg x 1, 20mg x 1, 10mg x 1  
  
 * Notice: This list has 5 medication(s) that are the same as other medications prescribed for you. Read the directions carefully, and ask your doctor or other care provider to review them with you. Prescriptions Printed Refills  
 oxyCODONE IR (ROXICODONE) 10 mg tab immediate release tablet 0 Sig: Take 1 Tab by mouth every six (6) hours as needed. Max Daily Amount: 40 mg.  
 Class: Print Route: Oral  
 amphetamine-dextroamphetamine XR (ADDERALL XR) 30 mg XR capsule 0 Sig: Take 1 Cap (30 mg total) by mouth dailyEarliest Fill Date: 8/27/18. Max Daily Amount: 30 mg  
 Class: Print Route: Oral  
 amphetamine-dextroamphetamine XR (ADDERALL XR) 30 mg XR capsule 0 Starting on: 9/26/2018 Sig: Take 1 Cap (30 mg total) by mouth dailyEarliest Fill Date: 9/26/18. Max Daily Amount: 30 mg  
 Class: Print Route: Oral  
 amphetamine-dextroamphetamine XR (ADDERALL XR) 30 mg XR capsule 0 Starting on: 10/26/2018 Sig: Take 1 Cap (30 mg total) by mouth dailyEarliest Fill Date: 10/26/18. Max Daily Amount: 30 mg  
 Class: Print Route: Oral  
  
Prescriptions Sent to Pharmacy Refills  
 naloxone (NARCAN) 4 mg/actuation nasal spray 1 Sig: Use 1 spray intranasally, then discard. Repeat with new spray every 2 min as needed for opioid overdose symptoms, alternating nostrils. Class: Normal  
 [facility] #: [phone] Follow-up Instructions Return in about 3 months (around 11/27/2018), or if symptoms worsen or fail to improve, for ADHD, asthma. Introducing [facility]! Chuck Herbert introduces [employer] patient portal. Now you can access parts of your medical record, email your doctor's office, and request medication refills online.    
 
1. In your internet browser, go to [email or web address] 2. Click on the First Time User? Click Here link in the Sign In box. You will see the New Member Sign Up page. 3. Enter your [employer] Access Code exactly as it appears below. You will not need to use this code after youve completed the sign-up process. If you do not sign up before the expiration date, you must request a new code. · [employer] Access Code: -[email or web address] Expires: 11/25/2018  4:23 PM 
 
4. Enter the last four digits of your Social Security Number (xxxx) and Date of Birth (mm/dd/yyyy) as indicated and click Submit. You will be taken to the next sign-up page. 5. Create a Startupeandot ID. This will be your [employer] login ID and cannot be changed, so think of one that is secure and easy to remember. 6. Create a [employer] password. You can change your password at any time. 7. Enter your Password Reset Question and Answer. This can be used at a later time if you forget your password. 8. Enter your e-mail address. You will receive e-mail notification when new information is available in [address] 9. Click Sign Up. You can now view and download portions of your medical record. 10. Click the Download Summary menu link to download a portable copy of your medical information. If you have questions, please visit the Frequently Asked Questions section of the [employer] website. Remember, [employer] is NOT to be used for urgent needs. For medical emergencies, dial 911. Now available from your iPhone and Android! Please provide this summary of care documentation to your next provider. Your primary care clinician is listed as [address] If you have any questions after today's visit, please call [phone].
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
## 2018-08-27 NOTE — PROGRESS NOTES
Exam Room [address] [facility] Pat Javed is a 35 y.o. male    Chief Complaint   Patient presents with    Foot Pain     right foot pain     1. Have you been to the ER, urgent care clinic since your last visit? Hospitalized since your last visit? Yes When: 8/20/2018 Where: [facility] Reason for visit: Right Foot Fracture    2. Have you seen or consulted any other health care providers outside of the [facility] since your last visit? Include any pap smears or colon screening.  No     Health Maintenance Due   Topic Date Due    Influenza Age 5 to Adult  08/01/2018    MEDICARE YEARLY EXAM  08/23/2018

## 2018-10-26 ENCOUNTER — ED HISTORICAL/CONVERTED ENCOUNTER (OUTPATIENT)
Dept: OTHER | Age: 33
End: 2018-10-26

## 2018-12-04 DIAGNOSIS — F90.9 ATTENTION DEFICIT HYPERACTIVITY DISORDER (ADHD), UNSPECIFIED ADHD TYPE: Primary | ICD-10-CM

## 2018-12-04 RX ORDER — DEXTROAMPHETAMINE SACCHARATE, AMPHETAMINE ASPARTATE MONOHYDRATE, DEXTROAMPHETAMINE SULFATE AND AMPHETAMINE SULFATE 7.5; 7.5; 7.5; 7.5 MG/1; MG/1; MG/1; MG/1
30 CAPSULE, EXTENDED RELEASE ORAL
Qty: 30 CAP | Refills: 0 | Status: SHIPPED | OUTPATIENT
Start: 2018-12-04 | End: 2019-03-25

## 2018-12-04 NOTE — TELEPHONE ENCOUNTER
Medication refill request:    Last Office Visit:   August 27, 2018   Next Office Visit:    Future Appointments   Date Time Provider Juan Jose Duttoni   1/28/2019  1:45 PM Dorothy Garcia MD [facility] [address] [facility] verified.   yes  Patient requesting 30 day supply

## 2018-12-05 NOTE — TELEPHONE ENCOUNTER
Patient called on number on file. Message left to return call to office. Please inform the patient that the adderall prescription is ready for pickup.

## 2019-03-25 ENCOUNTER — OFFICE VISIT (OUTPATIENT)
Dept: BEHAVIORAL/MENTAL HEALTH CLINIC | Age: 34
End: 2019-03-25

## 2019-03-25 ENCOUNTER — DOCUMENTATION ONLY (OUTPATIENT)
Dept: BEHAVIORAL/MENTAL HEALTH CLINIC | Age: 34
End: 2019-03-25

## 2019-03-25 VITALS
BODY MASS INDEX: 26.69 KG/M2 | HEIGHT: 73 IN | WEIGHT: 201.4 LBS | SYSTOLIC BLOOD PRESSURE: 124 MMHG | DIASTOLIC BLOOD PRESSURE: 85 MMHG | HEART RATE: 87 BPM

## 2019-03-25 DIAGNOSIS — F39 MOOD DISORDER (HCC): Primary | ICD-10-CM

## 2019-03-25 DIAGNOSIS — F41.9 ANXIETY: ICD-10-CM

## 2019-03-25 DIAGNOSIS — Z87.898 HISTORY OF ALCOHOL USE: ICD-10-CM

## 2019-03-25 RX ORDER — DIVALPROEX SODIUM 250 MG/1
250 TABLET, DELAYED RELEASE ORAL 3 TIMES DAILY
Qty: 60 TAB | Refills: 1 | Status: SHIPPED | OUTPATIENT
Start: 2019-03-25 | End: 2019-09-30 | Stop reason: SDUPTHER

## 2019-03-25 NOTE — PATIENT INSTRUCTIONS
Learning About Mood Disorders  What are mood disorders? Mood disorders are medical problems that affect how you feel. They can impact your moods, thoughts, and actions. Mood disorders include:  · Depression. This causes you to feel sad or hopeless for much of the time. · Bipolar disorder. This causes extreme mood changes from manic episodes of very high energy to extreme lows of depression. · Seasonal affective disorder (SAD). This is a type of depression that affects you during the same season each year. Most often people experience SAD during the fall and winter months when days are shorter and there is less light. What are the symptoms? Depression  You may:  · Feel sad or hopeless nearly every day. · Lose interest in or not get pleasure from most daily activities. You feel this way nearly every day. · Have low energy, changes in your appetite, or changes in how well you sleep. · Have trouble concentrating. · Think about death and suicide. Keep the numbers for these national suicide hotlines: 0-490-921-TALK (8-184.117.1068) and 0-255-NFSIAZT (2-329.555.4112). If you or someone you know talks about suicide or feeling hopeless, get help right away. Bipolar disorder  Symptoms depend on your mood swings. You may:  · Feel very happy, energetic, or on edge. · Feel like you need very little sleep. · Feel overly self-confident. · Do impulsive things, such as spending a lot of money. · Feel sad or hopeless. · Have racing thoughts or trouble thinking and making decisions. · Lose interest in things you have enjoyed in the past.  · Think about death and suicide. Keep the numbers for these national suicide hotlines: 8-656-658-TALK (4-119.942.9187) and 6-004-CEWXGOM (7-106.743.4597). If you or someone you know talks about suicide or feeling hopeless, get help right away. Seasonal affective disorder (SAD)  Symptoms come and go at about the same time each year.  For most people with SAD, symptoms come during the winter when there is less daylight. You may:  · Feel sad, grumpy, carter, or anxious. · Lose interest in your usual activities. · Eat more and crave carbohydrates, such as bread and pasta. · Gain weight. · Sleep more and feel drowsy during the daytime. How are mood disorders treated? Mood disorders can be treated with medicines or counseling, or a combination of both. Medicines for depression and SAD may include antidepressants. Medicines for bipolar disorder may include:  · Mood stabilizers. · Antipsychotics. · Benzodiazepines. Counseling may involve cognitive-behavioral therapy. It teaches you how to change the ways you think and behave. This can help you stop thinking bad thoughts about yourself and your life. Light therapy is the main treatment for SAD. This therapy uses a special kind of lamp. You let the lamp shine on you at certain times, usually in the morning. This may help your symptoms during the months when there is less sunlight. Healthy lifestyle  Healthy lifestyle changes may help you feel better. · Get at least 30 minutes of exercise on most days of the week. Walking is a good choice. · Eat a healthy diet. Include fruits, vegetables, lean proteins, and whole grains in your diet each day. · Keep a regular sleep schedule. Try for 8 hours of sleep a night. · Find ways to manage stress, such as relaxation exercises. · Avoid alcohol and illegal drugs. Follow-up care is a key part of your treatment and safety. Be sure to make and go to all appointments, and call your doctor if you are having problems. It's also a good idea to know your test results and keep a list of the medicines you take. Where can you learn more? Go to http://mazin-kenyatta.info/. Enter H314 in the search box to learn more about \"Learning About Mood Disorders. \"  Current as of: September 11, 2018  Content Version: 11.9  © 3684-8171 [employer].  Care instructions adapted under license by [address] (which disclaims liability or warranty for this information). If you have questions about a medical condition or this instruction, always ask your healthcare professional. [address] any warranty or liability for your use of this information.
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
## 2019-03-27 LAB
ALBUMIN SERPL-MCNC: 4.5 G/DL (ref 3.5–5.5)
ALBUMIN/GLOB SERPL: 1.7 {RATIO} (ref 1.2–2.2)
ALP SERPL-CCNC: 94 IU/L (ref 39–117)
ALT SERPL-CCNC: 29 IU/L (ref 0–44)
AMPHETAMINES UR QL SCN: NEGATIVE NG/ML
AST SERPL-CCNC: 23 IU/L (ref 0–40)
BARBITURATES UR QL SCN: NEGATIVE NG/ML
BASOPHILS # BLD AUTO: 0.1 X10E3/UL (ref 0–0.2)
BASOPHILS NFR BLD AUTO: 1 %
BENZODIAZ UR QL: NEGATIVE NG/ML
BILIRUB SERPL-MCNC: 0.2 MG/DL (ref 0–1.2)
BUN SERPL-MCNC: 11 MG/DL (ref 6–20)
BUN/CREAT SERPL: 11 (ref 9–20)
BZE UR QL: NEGATIVE NG/ML
CALCIUM SERPL-MCNC: 9.8 MG/DL (ref 8.7–10.2)
CANNABINOIDS UR QL SCN: NEGATIVE NG/ML
CHLORIDE SERPL-SCNC: 101 MMOL/L (ref 96–106)
CO2 SERPL-SCNC: 25 MMOL/L (ref 20–29)
CREAT SERPL-MCNC: 0.97 MG/DL (ref 0.76–1.27)
EOSINOPHIL # BLD AUTO: 0.7 X10E3/UL (ref 0–0.4)
EOSINOPHIL NFR BLD AUTO: 7 %
ERYTHROCYTE [DISTWIDTH] IN BLOOD BY AUTOMATED COUNT: 13.3 % (ref 12.3–15.4)
GLOBULIN SER CALC-MCNC: 2.7 G/DL (ref 1.5–4.5)
GLUCOSE SERPL-MCNC: 95 MG/DL (ref 65–99)
HCT VFR BLD AUTO: 45.4 % (ref 37.5–51)
HGB BLD-MCNC: 15.6 G/DL (ref 13–17.7)
IMM GRANULOCYTES # BLD AUTO: 0 X10E3/UL (ref 0–0.1)
IMM GRANULOCYTES NFR BLD AUTO: 0 %
LYMPHOCYTES # BLD AUTO: 2 X10E3/UL (ref 0.7–3.1)
LYMPHOCYTES NFR BLD AUTO: 21 %
MCH RBC QN AUTO: 28.9 PG (ref 26.6–33)
MCHC RBC AUTO-ENTMCNC: 34.4 G/DL (ref 31.5–35.7)
MCV RBC AUTO: 84 FL (ref 79–97)
MDMA UR QL SCN: NEGATIVE NG/ML
METHADONE UR QL SCN: NEGATIVE NG/ML
METHAQUALONE UR QL: NEGATIVE NG/ML
MONOCYTES # BLD AUTO: 0.9 X10E3/UL (ref 0.1–0.9)
MONOCYTES NFR BLD AUTO: 10 %
NEUTROPHILS # BLD AUTO: 5.9 X10E3/UL (ref 1.4–7)
NEUTROPHILS NFR BLD AUTO: 61 %
OPIATES UR QL: NEGATIVE NG/ML
PCP UR QL: NEGATIVE NG/ML
PLATELET # BLD AUTO: 231 X10E3/UL (ref 150–379)
POTASSIUM SERPL-SCNC: 4.7 MMOL/L (ref 3.5–5.2)
PROPOXYPH UR QL SCN: NEGATIVE NG/ML
PROT SERPL-MCNC: 7.2 G/DL (ref 6–8.5)
RBC # BLD AUTO: 5.39 X10E6/UL (ref 4.14–5.8)
SODIUM SERPL-SCNC: 141 MMOL/L (ref 134–144)
TSH SERPL DL<=0.005 MIU/L-ACNC: 1.13 UIU/ML (ref 0.45–4.5)
VALPROATE SERPL-MCNC: <4 UG/ML (ref 50–100)
WBC # BLD AUTO: 9.5 X10E3/UL (ref 3.4–10.8)

## 2019-09-30 ENCOUNTER — OFFICE VISIT (OUTPATIENT)
Dept: BEHAVIORAL/MENTAL HEALTH CLINIC | Age: 34
End: 2019-09-30

## 2019-09-30 VITALS
HEART RATE: 69 BPM | SYSTOLIC BLOOD PRESSURE: 124 MMHG | BODY MASS INDEX: 27.6 KG/M2 | TEMPERATURE: 98.3 F | WEIGHT: 209.2 LBS | DIASTOLIC BLOOD PRESSURE: 81 MMHG

## 2019-09-30 DIAGNOSIS — F41.9 ANXIETY: ICD-10-CM

## 2019-09-30 DIAGNOSIS — R45.4 OUTBURSTS OF ANGER: ICD-10-CM

## 2019-09-30 DIAGNOSIS — F39 MOOD DISORDER (HCC): Primary | ICD-10-CM

## 2019-09-30 DIAGNOSIS — Z91.14 NON COMPLIANCE W MEDICATION REGIMEN: ICD-10-CM

## 2019-09-30 RX ORDER — HYDROXYZINE 25 MG/1
25 TABLET, FILM COATED ORAL
Qty: 90 TAB | Refills: 1 | Status: SHIPPED | OUTPATIENT
Start: 2019-09-30

## 2019-09-30 RX ORDER — DIVALPROEX SODIUM 250 MG/1
250 TABLET, DELAYED RELEASE ORAL 3 TIMES DAILY
Qty: 90 TAB | Refills: 1 | Status: SHIPPED | OUTPATIENT
Start: 2019-09-30
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
## 2019-09-30 NOTE — PATIENT INSTRUCTIONS
Learning About Mood Disorders  What are mood disorders? Mood disorders are medical problems that affect how you feel. They can impact your moods, thoughts, and actions. Mood disorders include:  · Depression. This causes you to feel sad or hopeless for much of the time. · Bipolar disorder. This causes extreme mood changes from manic episodes of very high energy to extreme lows of depression. · Seasonal affective disorder (SAD). This is a type of depression that affects you during the same season each year. Most often people experience SAD during the fall and winter months when days are shorter and there is less light. What are the symptoms? Depression  You may:  · Feel sad or hopeless nearly every day. · Lose interest in or not get pleasure from most daily activities. You feel this way nearly every day. · Have low energy, changes in your appetite, or changes in how well you sleep. · Have trouble concentrating. · Think about death and suicide. Keep the numbers for these national suicide hotlines: 9-714-033-TALK (7-885.147.4673) and 5-207-YEAZNYB (3-562.841.5930). If you or someone you know talks about suicide or feeling hopeless, get help right away. Bipolar disorder  Symptoms depend on your mood swings. You may:  · Feel very happy, energetic, or on edge. · Feel like you need very little sleep. · Feel overly self-confident. · Do impulsive things, such as spending a lot of money. · Feel sad or hopeless. · Have racing thoughts or trouble thinking and making decisions. · Lose interest in things you have enjoyed in the past.  · Think about death and suicide. Keep the numbers for these national suicide hotlines: 8-184-587-TALK (0-516-107-945.499.4240) and 3-357-ZQTGIVR (5-710.446.3641). If you or someone you know talks about suicide or feeling hopeless, get help right away. Seasonal affective disorder (SAD)  Symptoms come and go at about the same time each year.  For most people with SAD, symptoms come during the winter when there is less daylight. You may:  · Feel sad, grumpy, carter, or anxious. · Lose interest in your usual activities. · Eat more and crave carbohydrates, such as bread and pasta. · Gain weight. · Sleep more and feel drowsy during the daytime. How are mood disorders treated? Mood disorders can be treated with medicines or counseling, or a combination of both. Medicines for depression and SAD may include antidepressants. Medicines for bipolar disorder may include:  · Mood stabilizers. · Antipsychotics. · Benzodiazepines. Counseling may involve cognitive-behavioral therapy. It teaches you how to change the ways you think and behave. This can help you stop thinking bad thoughts about yourself and your life. Light therapy is the main treatment for SAD. This therapy uses a special kind of lamp. You let the lamp shine on you at certain times, usually in the morning. This may help your symptoms during the months when there is less sunlight. Healthy lifestyle  Healthy lifestyle changes may help you feel better. · Get at least 30 minutes of exercise on most days of the week. Walking is a good choice. · Eat a healthy diet. Include fruits, vegetables, lean proteins, and whole grains in your diet each day. · Keep a regular sleep schedule. Try for 8 hours of sleep a night. · Find ways to manage stress, such as relaxation exercises. · Avoid alcohol and illegal drugs. Follow-up care is a key part of your treatment and safety. Be sure to make and go to all appointments, and call your doctor if you are having problems. It's also a good idea to know your test results and keep a list of the medicines you take. Where can you learn more? Go to http://mazin-kenyatta.info/. Enter N782 in the search box to learn more about \"Learning About Mood Disorders. \"  Current as of: May 28, 2019  Content Version: 12.2  © 5759-5200 [employer].  Care instructions adapted under license by [address] (which disclaims liability or warranty for this information). If you have questions about a medical condition or this instruction, always ask your healthcare professional. [address] any warranty or liability for your use of this information.

## 2019-10-01 LAB — VALPROATE SERPL-MCNC: <4 UG/ML (ref 50–100)

## 2019-10-11 NOTE — PROGRESS NOTES
Rm#[id number]  Chief Complaint   Patient presents with    Abdominal Pain     diarrhea x2 weeks, and abdomen pain      1. Have you been to the ER, urgent care clinic since your last visit? Hospitalized since your last visit? No    2. Have you seen or consulted any other health care providers outside of the [address] [place] [address] since your last visit? Include any pap smears or colon screening.  No   Refused flu vaccine    Health Maintenance Due   Topic Date Due    Pneumococcal 0-64 years (1 of 1 - PPSV23) [birth year]/1991    MEDICARE YEARLY EXAM  08/23/2018    Influenza Age 9 to Adult  08/01/2019

## 2019-10-14 ENCOUNTER — OFFICE VISIT (OUTPATIENT)
Dept: INTERNAL MEDICINE CLINIC | Age: 34
End: 2019-10-14

## 2019-10-14 VITALS
HEART RATE: 70 BPM | RESPIRATION RATE: 16 BRPM | OXYGEN SATURATION: 98 % | BODY MASS INDEX: 26.9 KG/M2 | DIASTOLIC BLOOD PRESSURE: 66 MMHG | SYSTOLIC BLOOD PRESSURE: 99 MMHG | TEMPERATURE: 98.2 F | HEIGHT: 73 IN | WEIGHT: 203 LBS

## 2019-10-14 DIAGNOSIS — R19.7 DIARRHEA, UNSPECIFIED TYPE: Primary | ICD-10-CM

## 2019-10-14 DIAGNOSIS — F90.9 ATTENTION DEFICIT HYPERACTIVITY DISORDER (ADHD), UNSPECIFIED ADHD TYPE: ICD-10-CM

## 2019-10-14 DIAGNOSIS — J45.909 UNCOMPLICATED ASTHMA, UNSPECIFIED ASTHMA SEVERITY, UNSPECIFIED WHETHER PERSISTENT: ICD-10-CM

## 2019-10-14 DIAGNOSIS — K57.92 DIVERTICULITIS: ICD-10-CM

## 2019-10-14 DIAGNOSIS — F31.81 BIPOLAR 2 DISORDER (HCC): ICD-10-CM

## 2019-10-14 RX ORDER — FLUTICASONE PROPIONATE AND SALMETEROL 250; 50 UG/1; UG/1
1 POWDER RESPIRATORY (INHALATION) 2 TIMES DAILY
Qty: 1 INHALER | Refills: 5 | Status: SHIPPED | OUTPATIENT
Start: 2019-10-14 | End: 2020-08-08 | Stop reason: SDUPTHER

## 2019-10-14 RX ORDER — DEXTROAMPHETAMINE SACCHARATE, AMPHETAMINE ASPARTATE MONOHYDRATE, DEXTROAMPHETAMINE SULFATE AND AMPHETAMINE SULFATE 7.5; 7.5; 7.5; 7.5 MG/1; MG/1; MG/1; MG/1
30 CAPSULE, EXTENDED RELEASE ORAL DAILY
Qty: 30 CAP | Refills: 0 | Status: SHIPPED | OUTPATIENT
Start: 2019-10-14 | End: 2019-11-12

## 2019-10-14 RX ORDER — ALBUTEROL SULFATE 90 UG/1
2 AEROSOL, METERED RESPIRATORY (INHALATION)
Qty: 1 INHALER | Refills: 2 | Status: SHIPPED | OUTPATIENT
Start: 2019-10-14 | End: 2020-08-08 | Stop reason: SDUPTHER

## 2019-10-14 RX ORDER — DEXTROAMPHETAMINE SACCHARATE, AMPHETAMINE ASPARTATE MONOHYDRATE, DEXTROAMPHETAMINE SULFATE AND AMPHETAMINE SULFATE 7.5; 7.5; 7.5; 7.5 MG/1; MG/1; MG/1; MG/1
30 CAPSULE, EXTENDED RELEASE ORAL DAILY
Qty: 30 CAP | Refills: 0 | Status: SHIPPED | OUTPATIENT
Start: 2019-11-13 | End: 2019-12-12

## 2019-10-14 RX ORDER — DEXTROAMPHETAMINE SACCHARATE, AMPHETAMINE ASPARTATE MONOHYDRATE, DEXTROAMPHETAMINE SULFATE AND AMPHETAMINE SULFATE 7.5; 7.5; 7.5; 7.5 MG/1; MG/1; MG/1; MG/1
30 CAPSULE, EXTENDED RELEASE ORAL DAILY
Qty: 30 CAP | Refills: 0 | Status: SHIPPED | OUTPATIENT
Start: 2019-12-13 | End: 2020-01-11

## 2019-10-14 RX ORDER — CIPROFLOXACIN 500 MG/1
500 TABLET ORAL 2 TIMES DAILY
Qty: 20 TAB | Refills: 0 | Status: SHIPPED | OUTPATIENT
Start: 2019-10-14 | End: 2019-10-24

## 2019-10-14 RX ORDER — METRONIDAZOLE 500 MG/1
500 TABLET ORAL 3 TIMES DAILY
Qty: 30 TAB | Refills: 0 | Status: SHIPPED | OUTPATIENT
Start: 2019-10-14 | End: 2019-10-24

## 2019-10-14 NOTE — PROGRESS NOTES
HPI:  Presents for acute care     Pt reports diarrhea and abd pain x 2 weeks  Had been watery, now improving - more formed in the past couple of days  +cramping prior to BM - improves s/p BM. No blood or hematochezia reported  No melena  No known food exposures    Sister had an intestinal illness recently which required surgery - pt unclear of the details  Pt reports antibiotics were Rx'd    Pt concerned he could have a similar illness to his sister. No fevers    Seeing psych for mood d/o NOS    Requests adderall refill here  adderall XR helps with focus and productivity at work    Past medical, Social, and Family history reviewed    Prior to Admission medications    Medication Sig Start Date End Date Taking? Authorizing Provider   divalproex DR (DEPAKOTE) 250 mg tablet Take 1 Tab by mouth three (3) times daily. 9/30/19   Maliha Clarke, NP   hydrOXYzine HCl (ATARAX) 25 mg tablet Take 1 Tab by mouth three (3) times daily as needed for Anxiety. 9/30/19   Maliha Clarke NP   albuterol (PROVENTIL VENTOLIN) 2.5 mg /3 mL (0.083 %) nebulizer solution 3 mL by Nebulization route every four (4) hours as needed for Wheezing. 5/29/18   Israel Geronimo MD   albuterol (PROVENTIL HFA, VENTOLIN HFA, PROAIR HFA) 90 mcg/actuation inhaler Take 2 Puffs by inhalation every four (4) hours as needed for Wheezing. Use with spacer. 5/29/18   Israel Geronimo MD   fluticasone-salmeterol (ADVAIR) 250-50 mcg/dose diskus inhaler Take 1 Puff by inhalation two (2) times a day. 5/29/18   Israel Geronimo MD   montelukast (SINGULAIR) 10 mg tablet Take 1 Tab by mouth daily. 5/29/18   Israel Geronimo MD          ROS  Complete ROS reviewed and negative or stable except as noted in HPI. Physical Exam   Constitutional: He is oriented to person, place, and time. He appears well-nourished. No distress. HENT:   Head: Normocephalic and atraumatic. Eyes: Pupils are equal, round, and reactive to light.  EOM are normal. No scleral icterus. Neck: Normal range of motion. Neck supple. No JVD present. Cardiovascular: Normal rate, regular rhythm and normal heart sounds. Exam reveals no gallop. No murmur heard. Pulmonary/Chest: Effort normal and breath sounds normal. No respiratory distress. He has no wheezes. He has no rales. Abdominal: Soft. Bowel sounds are normal. He exhibits no distension and no mass. There is tenderness (LLQ). There is guarding (voluntary - LLQ). There is no rebound. Musculoskeletal: Normal range of motion. He exhibits no edema. Lymphadenopathy:     He has no cervical adenopathy. Neurological: He is alert and oriented to person, place, and time. He exhibits normal muscle tone. Skin: Skin is warm. No rash noted. Psychiatric: He has a normal mood and affect. Nursing note and vitals reviewed. Prior labs reviewed. Reviewed drug screen from 3/25/19 - all negative. Assessment/Plan:  Possible diverticulitis vs resolving viral AGE   LLQ tenderness requires further eval and management    ICD-10-CM ICD-9-CM    1. Diarrhea, unspecified type R19.7 787.91 WBC, STOOL      CULTURE, STOOL      C DIFFICILE TOXIN GENE, MARISSA      GIARDIA/CRYPTOSPORIDIUM EIA      ADENOVIRUS (40/41)/ROTAVIRUS      ciprofloxacin HCl (CIPRO) 500 mg tablet      metroNIDAZOLE (FLAGYL) 500 mg tablet      CT ABD PELV W CONT   2. B[facility] 2 disorder ([facility] F31.81 296.89    3. Attention deficit hyperactivity disorder (ADHD), unspecified ADHD type F90.9 314.01 amphetamine-dextroamphetamine XR (ADDERALL XR) 30 mg XR capsule      amphetamine-dextroamphetamine XR (ADDERALL XR) 30 mg XR capsule      amphetamine-dextroamphetamine XR (ADDERALL XR) 30 mg XR capsule   4. Uncomplicated asthma, unspecified asthma severity, unspecified whether persistent J45.909 493.90 albuterol (PROVENTIL HFA, VENTOLIN HFA, PROAIR HFA) 90 mcg/actuation inhaler      fluticasone propion-salmeterol (ADVAIR/WIXELA) 250-50 mcg/dose diskus inhaler   5.  Diverticulitis K57.92 562.11 CT ABD PELV W CONT     Follow-up and Dispositions    · Return in about 3 months (around 1/14/2020), or if symptoms worsen or fail to improve, for ADHD. results and schedule of future studies reviewed with patient  reviewed diet, exercise and weight  reviewed medications and side effects in detail   Stool studies  CT abd/pelvis. cipro + flagyl empirically  Counseled to avoid etoh use with flagyl and pt expressed understanding.   Agree to adderall XR refills

## 2020-08-08 ENCOUNTER — TELEPHONE (OUTPATIENT)
Dept: INTERNAL MEDICINE CLINIC | Age: 35
End: 2020-08-08

## 2020-08-08 DIAGNOSIS — J45.909 UNCOMPLICATED ASTHMA, UNSPECIFIED ASTHMA SEVERITY, UNSPECIFIED WHETHER PERSISTENT: ICD-10-CM

## 2020-08-08 RX ORDER — ALBUTEROL SULFATE 90 UG/1
2 AEROSOL, METERED RESPIRATORY (INHALATION)
Qty: 1 INHALER | Refills: 0 | Status: SHIPPED | OUTPATIENT
Start: 2020-08-08 | End: 2022-04-29 | Stop reason: SDUPTHER

## 2020-08-08 RX ORDER — MONTELUKAST SODIUM 10 MG/1
10 TABLET ORAL DAILY
Qty: 30 TAB | Refills: 0 | Status: SHIPPED | OUTPATIENT
Start: 2020-08-08 | End: 2020-09-04

## 2020-08-08 RX ORDER — FLUTICASONE PROPIONATE AND SALMETEROL 250; 50 UG/1; UG/1
1 POWDER RESPIRATORY (INHALATION) 2 TIMES DAILY
Qty: 1 INHALER | Refills: 0 | Status: SHIPPED | OUTPATIENT
Start: 2020-08-08 | End: 2020-09-27

## 2020-08-08 NOTE — TELEPHONE ENCOUNTER
On call note:    Pt's family member calls from [place]. [place] cough and wheeze but doesn't have his albuterol or advair or singulair with him  No distress  Reports compliance with meds while at home. Agreed to send 30 day refills to pharmacy in [address] [facility] to f/u soon for an appt  Consider virtual visit this week if symptoms persist and he remains out of town.

## 2020-08-10 ENCOUNTER — VIRTUAL VISIT (OUTPATIENT)
Dept: INTERNAL MEDICINE CLINIC | Age: 35
End: 2020-08-10
Payer: MEDICARE

## 2020-08-10 DIAGNOSIS — J30.9 ALLERGIC RHINITIS, UNSPECIFIED SEASONALITY, UNSPECIFIED TRIGGER: ICD-10-CM

## 2020-08-10 DIAGNOSIS — F31.81 BIPOLAR 2 DISORDER (HCC): ICD-10-CM

## 2020-08-10 DIAGNOSIS — J45.909 UNCOMPLICATED ASTHMA, UNSPECIFIED ASTHMA SEVERITY, UNSPECIFIED WHETHER PERSISTENT: Primary | ICD-10-CM

## 2020-08-10 DIAGNOSIS — F17.200 SMOKING: ICD-10-CM

## 2020-08-10 PROCEDURE — 99214 OFFICE O/P EST MOD 30 MIN: CPT | Performed by: INTERNAL MEDICINE

## 2020-08-10 PROCEDURE — G9717 DOC PT DX DEP/BP F/U NT REQ: HCPCS | Performed by: INTERNAL MEDICINE

## 2020-08-10 PROCEDURE — G8427 DOCREV CUR MEDS BY ELIG CLIN: HCPCS | Performed by: INTERNAL MEDICINE

## 2020-08-10 RX ORDER — VARENICLINE TARTRATE 1 MG/1
1 TABLET, FILM COATED ORAL 2 TIMES DAILY
Qty: 60 TAB | Refills: 4 | Status: SHIPPED | OUTPATIENT
Start: 2020-08-10

## 2020-08-10 RX ORDER — VARENICLINE TARTRATE 25 MG
KIT ORAL
Qty: 1 DOSE PACK | Refills: 0 | Status: SHIPPED | OUTPATIENT
Start: 2020-08-10

## 2020-08-10 NOTE — PROGRESS NOTES
Clarita Slaughter is a 28 y.o. male who was seen by synchronous (real-time) audio-video technology on 8/10/2020. Consent: Clarita Slaughter, who was seen by synchronous (real-time) audio-video technology, and/or his healthcare decision maker, is aware that this patient-initiated, Telehealth encounter on 8/10/2020 is a billable service, with coverage as determined by his insurance carrier. He is aware that he may receive a bill and has provided verbal consent to proceed: Yes. I was in the office while conducting this encounter. Subjective:   Clarita Slaughter was seen for Asthma [place] using albuterol inhaler and wixela inhalers. feeling better)      Notes:  Pt with increased asthma sx     See telephone encounters    Seen in ER yest and given Rx for prednisone - not yet filled  Pt given neb tx  Pt reports neb tx worked better than albuterol MDI    Had COVID 19 testing - pending    Pt also smoking but desires quitting    Nursing screenings reviewed by provider at visit. Past medical, Social, and Family history reviewed  Medications reviewed and updated. No Known Allergies    Prior to Admission medications    Medication Sig Start Date End Date Taking? Authorizing Provider   albuterol (PROVENTIL HFA, VENTOLIN HFA, PROAIR HFA) 90 mcg/actuation inhaler Take 2 Puffs by inhalation every four (4) hours as needed for Wheezing. Use with spacer. 8/8/20  Yes Gab Del Angel MD   fluticasone propion-salmeteroL (ADVAIR/WIXELA) 250-50 mcg/dose diskus inhaler Take 1 Puff by inhalation two (2) times a day. 8/8/20  Yes Gab Del Angel MD   montelukast (SINGULAIR) 10 mg tablet Take 1 Tab by mouth daily. 8/8/20  Yes Gab Del Angel MD   divalproex DR (DEPAKOTE) 250 mg tablet Take 1 Tab by mouth three (3) times daily. 9/30/19   Maliha Clarke, NP   hydrOXYzine HCl (ATARAX) 25 mg tablet Take 1 Tab by mouth three (3) times daily as needed for Anxiety.  9/30/19 Maliha Clarke, NP   albuterol (PROVENTIL VENTOLIN) 2.5 mg /3 mL (0.083 %) nebulizer solution 3 mL by Nebulization route every four (4) hours as needed for Wheezing. 5/29/18   Chasity Borrero MD         ROS     A complete ROS was performed and negative except as noted in HPI    PHYSICAL EXAMINATION:    Vital Signs: There were no vitals taken for this visit. No flowsheet data found. Constitutional: [x] Appears well-developed and well-nourished [x] No apparent distress      Mental status: [x] Alert and awake  [x] Oriented [x] Able to follow commands       Eyes:   EOM    [x]  Normal      Sclera  [x]  Normal              Discharge [x]  None visible       HENT: [x] Normocephalic, atraumatic    [x] Mouth/Throat: Mucous membranes are moist    External Ears [x] Normal      Neck: [x] No visualized mass     Pulmonary/Chest: [x] Respiratory effort normal   [x] No visualized signs of difficulty breathing or respiratory distress    Musculoskeletal:  [x] Normal range of motion of neck    Neurological:        [x] No Facial Asymmetry (Cranial nerve 7 motor function) (limited exam due to video visit)          [x] No gaze palsy     Skin:        [x] No significant exanthematous lesions or discoloration noted on facial skin             Psychiatric:       [x] Normal Affect       Other pertinent observable physical exam findings:  None. We discussed the expected course, resolution and complications of the diagnosis(es) in detail. Medication risks, benefits, costs, interactions, and alternatives were discussed as indicated. I advised him to contact the office if his condition worsens, changes or fails to improve as anticipated. He expressed understanding with the diagnosis(es) and plan. Starr Lin is a 28 y.o. male who was evaluated by a video visit encounter for concerns as above. Patient identification was verified prior to start of the visit. A caregiver was present when appropriate.  Due to this being a TeleHealth encounter (During VXQQE-99 public health emergency), evaluation of the following organ systems was limited: Vitals/Constitutional/EENT/Resp/CV/GI//MS/Neuro/Skin/Heme-Lymph-Imm. Pursuant to the emergency declaration under the [facility] [place] [address] [facility] authority and the [employer] Act, this Virtual  Visit was conducted, with patient's (and/or legal guardian's) consent, to reduce the patient's risk of exposure to COVID-19 and provide necessary medical care. Services were provided through a video synchronous discussion virtually to substitute for in-person clinic visit. Assessment & Plan:   Diagnoses and all orders for this visit:      ICD-10-CM ICD-9-CM    1. Uncomplicated asthma, unspecified asthma severity, unspecified whether persistent  J45.909 493.90 inhalational spacing device   2. Bipolar 2 disorder ([facility]  F31.81 296.89    3. Allergic rhinitis, unspecified seasonality, unspecified trigger  J30.9 477.9    4. Smoking  F17.200 305.1 varenicline (CHANTIX STARTER LESLI) 0.5 mg (11)- 1 mg (42) DsPk      varenicline (CHANTIX) 1 mg tablet     Follow-up and Dispositions    · Return in about 2 months (around 10/10/2020), or if symptoms worsen or fail to improve, for asthma. results and schedule of future studies reviewed with patient  reviewed diet, exercise and weight  very strongly urged to quit smoking to reduce cardiovascular risk  cardiovascular risk and specific lipid/LDL goals reviewed  reviewed medications and side effects in detail    Spacer device ordered  Complete prednisone  Continue wilexa, singulair  Can arrange for nebulizer   Chantix      Letter for court - mother to      AVS:  []  Sent to patient as [employer] message after visit. [x]  Mailed to patient after visit. []  Not sent to patient after visit.

## 2020-08-10 NOTE — PROGRESS NOTES
[phone]    Needs nebulizer machine   Prescribed at the hosp- symbicort inhaler and prednisone. Pt hasnt picked up either medications     PAIN LEVEL 0     Chief Complaint   Patient presents with    Asthma     [place] using albuterol inhaler and wixela inhalers. feeling better     1. Have you been to the ER, urgent care clinic since your last visit? Hospitalized since your last visit? Yes Reason for visit: asthma flair up 8-9-20, [facility]     2. Have you seen or consulted any other health care providers outside of the [address] [place] [address] since your last visit? Include any pap smears or colon screening.  No     Health Maintenance Due   Topic Date Due    Pneumococcal 0-64 years (1 of 1 - PPSV23) 04/03/1991    Medicare Yearly Exam  08/23/2018    Influenza Age 5 to Adult  08/01/2020

## 2020-08-10 NOTE — LETTER
8/10/2020 RE: 
 
Mr. Delfino Brady [address] [id number]  - 1985 To Whom It May Concern: 
 
Delfino Brady is currently under the care of [facility]. He is currently being treated for an asthma exacerbation as well as being under quarantine pending the results of a COVID 19 test.  Please excuse him from his court date responsibility on 20. Thank you for your consideration. If there are questions or concerns please have the patient contact our office. Sincerely, Scott Barnard MD

## 2020-09-04 DIAGNOSIS — J45.909 UNCOMPLICATED ASTHMA, UNSPECIFIED ASTHMA SEVERITY, UNSPECIFIED WHETHER PERSISTENT: ICD-10-CM

## 2020-09-04 RX ORDER — MONTELUKAST SODIUM 10 MG/1
TABLET ORAL
Qty: 30 TAB | Refills: 5 | Status: SHIPPED | OUTPATIENT
Start: 2020-09-04

## 2020-09-27 DIAGNOSIS — J45.909 UNCOMPLICATED ASTHMA, UNSPECIFIED ASTHMA SEVERITY, UNSPECIFIED WHETHER PERSISTENT: ICD-10-CM

## 2020-09-27 RX ORDER — FLUTICASONE PROPIONATE AND SALMETEROL 250; 50 UG/1; UG/1
POWDER RESPIRATORY (INHALATION)
Qty: 60 EACH | Refills: 5 | Status: SHIPPED | OUTPATIENT
Start: 2020-09-27

## 2021-02-17 ENCOUNTER — APPOINTMENT (OUTPATIENT)
Dept: GENERAL RADIOLOGY | Age: 36
End: 2021-02-17
Attending: PHYSICIAN ASSISTANT
Payer: MEDICARE

## 2021-02-17 ENCOUNTER — HOSPITAL ENCOUNTER (EMERGENCY)
Age: 36
Discharge: HOME | End: 2021-02-17
Attending: EMERGENCY MEDICINE | Admitting: EMERGENCY MEDICINE
Payer: MEDICARE

## 2021-02-17 VITALS
TEMPERATURE: 98 F | RESPIRATION RATE: 15 BRPM | SYSTOLIC BLOOD PRESSURE: 119 MMHG | DIASTOLIC BLOOD PRESSURE: 73 MMHG | HEART RATE: 64 BPM | OXYGEN SATURATION: 99 %

## 2021-02-17 DIAGNOSIS — S39.012A BACK STRAIN, INITIAL ENCOUNTER: Primary | ICD-10-CM

## 2021-02-17 DIAGNOSIS — M54.6 ACUTE RIGHT-SIDED THORACIC BACK PAIN: ICD-10-CM

## 2021-02-17 LAB
ALBUMIN SERPL-MCNC: 4.1 G/DL (ref 3.5–5)
ALBUMIN/GLOB SERPL: 1.2 {RATIO} (ref 1.1–2.2)
ALP SERPL-CCNC: 84 U/L (ref 45–117)
ALT SERPL-CCNC: 31 U/L (ref 12–78)
ANION GAP SERPL CALC-SCNC: 3 MMOL/L (ref 5–15)
AST SERPL-CCNC: 12 U/L (ref 15–37)
BASOPHILS # BLD: 0.1 K/UL (ref 0–0.1)
BASOPHILS NFR BLD: 1 % (ref 0–1)
BILIRUB SERPL-MCNC: 0.4 MG/DL (ref 0.2–1)
BUN SERPL-MCNC: 10 MG/DL (ref 6–20)
BUN/CREAT SERPL: 10 (ref 12–20)
CALCIUM SERPL-MCNC: 9.3 MG/DL (ref 8.5–10.1)
CHLORIDE SERPL-SCNC: 107 MMOL/L (ref 97–108)
CO2 SERPL-SCNC: 28 MMOL/L (ref 21–32)
COMMENT, HOLDF: NORMAL
CREAT SERPL-MCNC: 0.99 MG/DL (ref 0.7–1.3)
D DIMER PPP FEU-MCNC: <0.19 MG/L FEU (ref 0–0.65)
DIFFERENTIAL METHOD BLD: NORMAL
EOSINOPHIL # BLD: 0.2 K/UL (ref 0–0.4)
EOSINOPHIL NFR BLD: 3 % (ref 0–7)
ERYTHROCYTE [DISTWIDTH] IN BLOOD BY AUTOMATED COUNT: 12.4 % (ref 11.5–14.5)
GLOBULIN SER CALC-MCNC: 3.3 G/DL (ref 2–4)
GLUCOSE SERPL-MCNC: 76 MG/DL (ref 65–100)
HCT VFR BLD AUTO: 48 % (ref 36.6–50.3)
HGB BLD-MCNC: 15.6 G/DL (ref 12.1–17)
IMM GRANULOCYTES # BLD AUTO: 0 K/UL (ref 0–0.04)
IMM GRANULOCYTES NFR BLD AUTO: 0 % (ref 0–0.5)
LYMPHOCYTES # BLD: 1.9 K/UL (ref 0.8–3.5)
LYMPHOCYTES NFR BLD: 27 % (ref 12–49)
MCH RBC QN AUTO: 28.8 PG (ref 26–34)
MCHC RBC AUTO-ENTMCNC: 32.5 G/DL (ref 30–36.5)
MCV RBC AUTO: 88.6 FL (ref 80–99)
MONOCYTES # BLD: 0.6 K/UL (ref 0–1)
MONOCYTES NFR BLD: 9 % (ref 5–13)
NEUTS SEG # BLD: 4.2 K/UL (ref 1.8–8)
NEUTS SEG NFR BLD: 60 % (ref 32–75)
NRBC # BLD: 0 K/UL (ref 0–0.01)
NRBC BLD-RTO: 0 PER 100 WBC
PLATELET # BLD AUTO: 216 K/UL (ref 150–400)
PMV BLD AUTO: 10.5 FL (ref 8.9–12.9)
POTASSIUM SERPL-SCNC: 3.7 MMOL/L (ref 3.5–5.1)
PROT SERPL-MCNC: 7.4 G/DL (ref 6.4–8.2)
RBC # BLD AUTO: 5.42 M/UL (ref 4.1–5.7)
SAMPLES BEING HELD,HOLD: NORMAL
SODIUM SERPL-SCNC: 138 MMOL/L (ref 136–145)
TROPONIN I SERPL-MCNC: <0.05 NG/ML
WBC # BLD AUTO: 7 K/UL (ref 4.1–11.1)

## 2021-02-17 PROCEDURE — 93005 ELECTROCARDIOGRAM TRACING: CPT

## 2021-02-17 PROCEDURE — 71046 X-RAY EXAM CHEST 2 VIEWS: CPT

## 2021-02-17 PROCEDURE — 96374 THER/PROPH/DIAG INJ IV PUSH: CPT

## 2021-02-17 PROCEDURE — 85379 FIBRIN DEGRADATION QUANT: CPT

## 2021-02-17 PROCEDURE — 85025 COMPLETE CBC W/AUTO DIFF WBC: CPT

## 2021-02-17 PROCEDURE — 36415 COLL VENOUS BLD VENIPUNCTURE: CPT

## 2021-02-17 PROCEDURE — 99284 EMERGENCY DEPT VISIT MOD MDM: CPT

## 2021-02-17 PROCEDURE — 99283 EMERGENCY DEPT VISIT LOW MDM: CPT

## 2021-02-17 PROCEDURE — 80053 COMPREHEN METABOLIC PANEL: CPT

## 2021-02-17 PROCEDURE — 84484 ASSAY OF TROPONIN QUANT: CPT

## 2021-02-17 PROCEDURE — 74011250636 HC RX REV CODE- 250/636: Performed by: PHYSICIAN ASSISTANT

## 2021-02-17 RX ORDER — KETOROLAC TROMETHAMINE 30 MG/ML
15 INJECTION, SOLUTION INTRAMUSCULAR; INTRAVENOUS
Status: COMPLETED | OUTPATIENT
Start: 2021-02-17 | End: 2021-02-17

## 2021-02-17 RX ORDER — NAPROXEN 500 MG/1
500 TABLET ORAL 2 TIMES DAILY WITH MEALS
Qty: 20 TAB | Refills: 0 | Status: SHIPPED | OUTPATIENT
Start: 2021-02-17 | End: 2021-02-27

## 2021-02-17 RX ORDER — CYCLOBENZAPRINE HCL 5 MG
5 TABLET ORAL
Qty: 15 TAB | Refills: 0 | Status: SHIPPED | OUTPATIENT
Start: 2021-02-17

## 2021-02-17 RX ADMIN — KETOROLAC TROMETHAMINE 15 MG: 30 INJECTION, SOLUTION INTRAMUSCULAR at 20:25

## 2021-02-17 NOTE — ED TRIAGE NOTES
Triage: Pt arrives from home with CC of sharp back pain between the shoulder blades. Pt reports the pain is worsened upon inspiration. He denies any specific injury or trauma. No recent travel. Pt is a smoker.

## 2021-02-18 ENCOUNTER — PATIENT OUTREACH (OUTPATIENT)
Dept: CASE MANAGEMENT | Age: 36
End: 2021-02-18

## 2021-02-18 LAB
ATRIAL RATE: 76 BPM
CALCULATED P AXIS, ECG09: 76 DEGREES
CALCULATED R AXIS, ECG10: 70 DEGREES
CALCULATED T AXIS, ECG11: 60 DEGREES
DIAGNOSIS, 93000: NORMAL
P-R INTERVAL, ECG05: 134 MS
Q-T INTERVAL, ECG07: 376 MS
QRS DURATION, ECG06: 82 MS
QTC CALCULATION (BEZET), ECG08: 423 MS
VENTRICULAR RATE, ECG03: 76 BPM

## 2021-02-18 NOTE — PROGRESS NOTES
Patient contacted regarding recent discharge and COVID-19 risk. [facility] ED 21 dx-back strain, acute Rt sided thoracic back pain    Discussed COVID-19 related testing which was not done at this time. Test results were not done. Patient informed of results, if available? n/a    Outreach made within 2 business days of discharge: Yes     Pt stated he is still having pain, but has not started the flexeril or naproxen. Pt will  today. Pt will contact PCP. Care Transition Nurse/ Ambulatory Care Manager/ LPN Care Coordinator contacted the patient by telephone to perform post discharge assessment. Verified name and  with patient as identifiers. Patient has following risk factors of: asthma and tobacco abuse. CTN/ACM/LPN reviewed discharge instructions, medical action plan and red flags related to discharge diagnosis. Reviewed and educated them on any new and changed medications related to discharge diagnosis. Advised obtaining a 90-day supply of all daily and as-needed medications. Advance Care Planning:   Does patient have an Advance Directive: currently not on file; education provided     Education provided regarding infection prevention, and signs and symptoms of COVID-19 and when to seek medical attention with patient who verbalized understanding. Discussed exposure protocols and quarantine from [address] you at higher risk for severe illness 2019 and given an opportunity for questions and concerns. The patient agrees to contact the COVID-19 hotline [phone] or PCP office for questions related to their healthcare. CTN/ACM/LPN provided contact information for future reference. From CDC: Are you at higher risk for severe illness?  Wash your hands often.  Avoid close contact (6 feet, which is about two arm lengths) with people who are sick.  Put distance between yourself and other people if COVID-19 is spreading in your community.    Clean and disinfect frequently touched surfaces.  Avoid all cruise travel and non-essential air travel.  Call your healthcare professional if you have concerns about COVID-19 and your underlying condition or if you are sick. For more information on steps you can take to protect yourself, see CDC's How to Protect Yourself      Patient/family/caregiver given information for [facility] and agrees to enroll no      Plan for follow-up call in 7-14 days based on severity of symptoms and risk factors.

## 2021-02-18 NOTE — ACP (ADVANCE CARE PLANNING)
Advance Care Planning:   Does patient have an Advance Directive: currently not on file; education provided       Primary Decision Maker: Darren Jaquez - Mother - [phone]

## 2021-02-18 NOTE — ED PROVIDER NOTES
28year old male presenting to the ED for back pain. Pt notes that at about 7PM he started with new onset of pain in his back, between the shoulder blades, more on the right side. Notes that it hurts with deep inspiration or also with certain movements. Denies SOB or CP. Patient denies hx VTE, recent immobilization, hemoptysis, unilateral leg pain/swelling. No treatment PTA. Describes pain as sharp, moderately severe. 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 PMHx: ADHD, anxiety, depression, bipolar  PSx: dental surgery  Social: + smoker. No alcohol use.  + marijuana use. Works for [employer]. The history is provided by the patient. Back Pain   Pertinent negatives include no chest pain and no fever. Shortness of Breath  Pertinent negatives include no fever, no cough, no chest pain and no vomiting.         Past Medical History:   Diagnosis Date    ADHD (attention deficit hyperactivity disorder) 12/7/2009    ADHD (attention deficit hyperactivity disorder)     Allergic rhinitis     Asthma 12/7/2009    Bipolar 2 disorder ([facility]     ED (erectile dysfunction)     Fracture, foot 08/20/2018    Right Foot    HSV (herpes simplex virus) infection     Psychiatric disorder     ADHD, anxiety, depression, mood swings       Past Surgical History:   Procedure Laterality Date    HX HEENT      Bottom molar removed at [facility] on 4/13/2012         Family History:   Problem Relation Age of Onset    Asthma Mother        Social History     Socioeconomic History    Marital status: UNKNOWN     Spouse name: Not on file    Number of children: Not on file    Years of education: Not on file    Highest education level: Not on file   Occupational History    Not on file   Social Needs    Financial resource strain: Not on file    Food insecurity     Worry: Not on file     Inability: Not on file    Transportation needs     Medical: Not on file     Non-medical: Not on file   Tobacco Use    Smoking status: Current Every Day Smoker     Packs/day: 0.40     Years: 15.00     Pack years: 6.00     Types: Cigars    Smokeless tobacco: Never Used   Substance and Sexual Activity    Alcohol use: Yes     Alcohol/week: 0.0 standard drinks     Comment: rare     Drug use: Not Currently    Sexual activity: Yes     Partners: Female   Lifestyle    Physical activity     Days per week: Not on file     Minutes per session: Not on file    Stress: Not on file   Relationships    Social connections     Talks on phone: Not on file     Gets together: Not on file     Attends Confucianist service: Not on file     Active member of club or organization: Not on file     Attends meetings of clubs or organizations: Not on file     Relationship status: Not on file    Intimate partner violence     Fear of current or ex partner: Not on file     Emotionally abused: Not on file     Physically abused: Not on file     Forced sexual activity: Not on file   Other Topics Concern    Not on file   Social History Narrative    Mother is primary support for organising healthcare as reported in august 2015. ALLERGIES: Patient has no known allergies. Review of Systems   Constitutional: Negative for fever. HENT: Negative for facial swelling. Respiratory: Negative for cough and chest tightness. Cardiovascular: Negative for chest pain. Gastrointestinal: Negative for vomiting. Musculoskeletal: Positive for back pain. Skin: Negative for wound. Neurological: Negative for syncope. All other systems reviewed and are negative. Vitals:    02/17/21 1857   BP: 123/73   Pulse: 82   Resp: 16   Temp: 97.1 °F (36.2 °C)   SpO2: 100%            Physical Exam  Vitals signs and nursing note reviewed. Constitutional:       General: He is not in acute distress. Appearance: He is well-developed. Comments: Pleasant, well-appearing black male   HENT:      Head: Normocephalic and atraumatic.       Right Ear: External ear normal. Left Ear: External ear normal.   Eyes:      General: No scleral icterus. Conjunctiva/sclera: Conjunctivae normal.   Neck:      Musculoskeletal: Neck supple. Trachea: No tracheal deviation. Cardiovascular:      Rate and Rhythm: Normal rate and regular rhythm. Heart sounds: Normal heart sounds. No murmur. No friction rub. No gallop. Pulmonary:      Effort: Pulmonary effort is normal. No respiratory distress. Breath sounds: Normal breath sounds. No stridor. No wheezing. Abdominal:      General: There is no distension. Palpations: Abdomen is soft. Musculoskeletal: Normal range of motion. Comments: + Tenderness over the right thoracic muscles   Skin:     General: Skin is warm and dry. Neurological:      Mental Status: He is alert and oriented to person, place, and time. Psychiatric:         Behavior: Behavior normal.          MDM  Number of Diagnoses or Management Options  Diagnosis management comments: 80-year-old male presenting to the ED for acute onset of right thoracic back pain this evening, hurts to take a deep breath. No trauma. PERC negative, denies chest pain or shortness of breath. Basic labs ordered in triage, will also add chest x-ray, Toradol for pain, reassess. Patient reports overall feeling better. Discussed with patient that pain could be inflammation of the lungs, muscle pain, etc.  Will discharge with prescription for NSAID and muscle relaxer, very specific return precautions given.        Amount and/or Complexity of Data Reviewed  Clinical lab tests: ordered and reviewed  Tests in the radiology section of CPT®: ordered and reviewed  Discuss the patient with other providers: yes (Dr. Mary Azul ED attending)           Procedures

## 2021-03-05 ENCOUNTER — PATIENT OUTREACH (OUTPATIENT)
Dept: CASE MANAGEMENT | Age: 36
End: 2021-03-05

## 2021-03-05 NOTE — PROGRESS NOTES
Patient resolved from Transition of Care episode on 3/5/21 - f/u-[facility] ED 2/17/21 dx-back strain, acute Rt sided thoracic back pain. ACM/CTN was unsuccessful at contacting this patient today. Patient/family was provided the following resources and education related to COVID-19 during the initial call:                         Signs, symptoms and red flags related to COVID-19            CDC exposure and quarantine guidelines            [facility] exposure contact - [phone]            Contact for their local Department of Health                 Patient has not had any additional ED or hospital visits. No further outreach scheduled with this CTN/ACM. Episode of Care resolved. Patient has this CTN/ACM contact information if future needs arise.

## 2021-04-20 ENCOUNTER — HOSPITAL ENCOUNTER (EMERGENCY)
Age: 36
Discharge: HOME | End: 2021-04-20
Attending: EMERGENCY MEDICINE
Payer: MEDICARE

## 2021-04-20 VITALS
TEMPERATURE: 98 F | HEIGHT: 73 IN | BODY MASS INDEX: 23.96 KG/M2 | HEART RATE: 76 BPM | RESPIRATION RATE: 14 BRPM | OXYGEN SATURATION: 96 % | SYSTOLIC BLOOD PRESSURE: 118 MMHG | WEIGHT: 180.78 LBS | DIASTOLIC BLOOD PRESSURE: 89 MMHG

## 2021-04-20 DIAGNOSIS — Z20.822 EXPOSURE TO COVID-19 VIRUS: Primary | ICD-10-CM

## 2021-04-20 LAB — SARS-COV-2, COV2: NORMAL

## 2021-04-20 PROCEDURE — U0003 INFECTIOUS AGENT DETECTION BY NUCLEIC ACID (DNA OR RNA); SEVERE ACUTE RESPIRATORY SYNDROME CORONAVIRUS 2 (SARS-COV-2) (CORONAVIRUS DISEASE [COVID-19]), AMPLIFIED PROBE TECHNIQUE, MAKING USE OF HIGH THROUGHPUT TECHNOLOGIES AS DESCRIBED BY CMS-2020-01-R: HCPCS

## 2021-04-20 PROCEDURE — 99281 EMR DPT VST MAYX REQ PHY/QHP: CPT

## 2021-04-20 NOTE — ED PROVIDER NOTES
EMERGENCY DEPARTMENT HISTORY AND PHYSICAL EXAM      Date: 4/20/2021  Patient Name: Renata Farr    History of Presenting Illness     Chief Complaint   Patient presents with    Concern For COVID-19 (Coronavirus)     Family member who lives in the same house positive for covid       History Provided By: Patient    HPI: Renata Farr, 39 y.o. male with PMHx significant for asthma, presents by POV to the ED without complaint. He reports that there are 2 family members that live in his household that recently tested positive for Covid. He has no complaints of presents the ED for evaluation and testing today. Travel: There has been no recent international or domestic travel. There are no other complaints, changes, or physical findings at this time. Social Hx: Tobacco (1/2 ppd), EtOH (deneis), Illicit drug use (denies)     PCP: Jimi Pedraza MD    No current facility-administered medications on file prior to encounter. Current Outpatient Medications on File Prior to Encounter   Medication Sig Dispense Refill    cyclobenzaprine (FLEXERIL) 5 mg tablet Take 1 Tab by mouth three (3) times daily as needed for Muscle Spasm(s). 15 Tab 0    Wixela Inhub 250-50 mcg/dose diskus inhaler INHALE 1 PUFF BY MOUTH TWICE DAILY 60 Each 5    montelukast (SINGULAIR) 10 mg tablet TAKE 1 TABLET BY MOUTH EVERY DAY 30 Tab 5    inhalational spacing device 1 Each by Does Not Apply route as needed for Wheezing. 1 Device 1    varenicline (CHANTIX STARTER LESLI) 0.5 mg (11)- 1 mg (42) DsPk Per dose pack instructions. 1 Dose Pack 0    varenicline (CHANTIX) 1 mg tablet Take 1 Tab by mouth two (2) times a day. 60 Tab 4    albuterol (PROVENTIL HFA, VENTOLIN HFA, PROAIR HFA) 90 mcg/actuation inhaler Take 2 Puffs by inhalation every four (4) hours as needed for Wheezing. Use with spacer. 1 Inhaler 0    divalproex DR (DEPAKOTE) 250 mg tablet Take 1 Tab by mouth three (3) times daily.  90 Tab 1    hydrOXYzine HCl (ATARAX) 25 mg tablet Take 1 Tab by mouth three (3) times daily as needed for Anxiety. 90 Tab 1    albuterol (PROVENTIL VENTOLIN) 2.5 mg /3 mL (0.083 %) nebulizer solution 3 mL by Nebulization route every four (4) hours as needed for Wheezing. 1 Each 4       Past History     Past Medical History:  Past Medical History:   Diagnosis Date    ADHD (attention deficit hyperactivity disorder) 12/7/2009    ADHD (attention deficit hyperactivity disorder)     Allergic rhinitis     Asthma 12/7/2009    Bipolar 2 disorder ([facility] [address]     ED (erectile dysfunction)     Fracture, foot 08/20/2018    Right Foot    HSV (herpes simplex virus) infection     Psychiatric disorder     ADHD, anxiety, depression, mood swings       Past Surgical History:  Past Surgical History:   Procedure Laterality Date    HX HEENT      Bottom molar removed at [facility] on 4/13/2012       Family History:  Family History   Problem Relation Age of Onset    Asthma Mother        Social History:  Social History     Tobacco Use    Smoking status: Current Every Day Smoker     Packs/day: 0.40     Years: 15.00     Pack years: 6.00     Types: Cigars    Smokeless tobacco: Never Used   Substance Use Topics    Alcohol use: Yes     Alcohol/week: 0.0 standard drinks     Comment: rare     Drug use: Not Currently       Allergies:  No Known Allergies      Review of Systems   Review of Systems   Constitutional: Negative for chills, diaphoresis and fever. HENT: Negative for congestion, ear pain, rhinorrhea and sore throat. Respiratory: Negative for cough and shortness of breath. Cardiovascular: Negative for chest pain. Gastrointestinal: Negative for abdominal pain, constipation, diarrhea, nausea and vomiting. Genitourinary: Negative for difficulty urinating, dysuria, frequency and hematuria. Musculoskeletal: Negative for arthralgias and myalgias. Neurological: Negative for headaches. All other systems reviewed and are negative.       Physical Exam   Physical Exam  Vitals signs and nursing note reviewed. Constitutional:       General: He is not in acute distress. Appearance: He is well-developed. He is not diaphoretic. Comments: 39 y.o. -American male    HENT:      Head: Normocephalic and atraumatic. Eyes:      General:         Right eye: No discharge. Left eye: No discharge. Conjunctiva/sclera: Conjunctivae normal.   Neck:      Musculoskeletal: Normal range of motion and neck supple. Cardiovascular:      Rate and Rhythm: Normal rate and regular rhythm. Heart sounds: Normal heart sounds. No murmur. Pulmonary:      Effort: Pulmonary effort is normal. No respiratory distress. Breath sounds: Normal breath sounds. Skin:     General: Skin is warm and dry. Neurological:      Mental Status: He is alert and oriented to person, place, and time. Psychiatric:         Behavior: Behavior normal.         Diagnostic Study Results     Labs - COVID-19 testing pending at discharge. Radiologic Studies - None    Medical Decision Making   I am the first provider for this patient. I reviewed the vital signs, available nursing notes, past medical history, past surgical history, family history and social history. Vital Signs-Reviewed the patient's vital signs. Patient Vitals for the past 12 hrs:   Temp Pulse Resp BP SpO2   04/20/21 1338 98 °F (36.7 °C) 76 14 118/89 96 %       Records Reviewed: Nursing Notes    Provider Notes (Medical Decision Making): The evaluation, management, and disposition decisions of this patient have been made in the context of the current and rapidly developing COVID-19 pandemic. In my clinical judgment, the balance of clinical factors dictate expedited evaluation and discharge from the ED. I have carefully considered the risk and benefits of prolonged ED workups and/or hospitalization vs their risk of acquiring or transmitting COVID-19.  I have made reasonable efforts to conserve healthcare resources and defer to safe outpatient alternatives when feasible. I have also discussed the importance of social distancing and proper hygiene to the patient. Based on an appropriate medical screening exam, there is currently no evidence of an emergency medical condition in the patient, and he is clinically safe for discharge. This was a collective decision made with the patient and/or any available family/caretakers. They expressed understanding and agreement with the above. ED Course:   Initial assessment performed. The patients presenting problems have been discussed, and they are in agreement with the care plan formulated and outlined with them. I have encouraged them to ask questions as they arise throughout their visit. Critical Care Time: None    Disposition:  DISCHARGE NOTE:  1:58 PM  The pt is ready for discharge. The pt's signs, symptoms, diagnosis, and discharge instructions have been discussed and pt has conveyed their understanding. The pt is to follow up as recommended or return to ER should their symptoms worsen. Plan has been discussed and pt is in agreement. PLAN:  1. Current Discharge Medication List        2. Follow-up Information     Follow up With Specialties Details Why Charisse Flower MD Pediatric Medicine, Internal Medicine In 1 week As needed [address] [place] [address]  [phone]          3. COVID Testing results will be called once available if positive. Patient should utilize My Chart to access results. 4. Take Tylenol as needed; Avoid NSAIDs  5. Drink plenty of fluids  6. It is advised to lay prone for 3 hours daily  Return to ED if worse     Diagnosis     Clinical Impression:   1. Exposure to COVID-19 virus          Please note that this dictation was completed with [employer], the computer voice recognition software.  Quite often unanticipated grammatical, syntax, homophones, and other interpretive errors are inadvertently transcribed by the computer software. Please disregards these errors. Please excuse any errors that have escaped final proofreading. This note will not be viewable in [address]

## 2021-04-20 NOTE — ED NOTES
Melda Rounds reviewed discharge instructions with the patient and guardian. The patient and guardian verbalized understanding.

## 2021-04-20 NOTE — LETTER
[address] 
[facility] EMERGENCY DEPT 
[address] [facility] [id number] [phone] Work/School Note Date: 4/20/2021 To Whom It May concern: 
 
Nia Gillespie was seen and treated today in the emergency room by the following provider(s): 
Attending Provider: Henna Amin MD 
Physician Assistant: Drake Carrasco. In light of the current COVID-19 pandemic, please excuse your employee from work under the circumstance below: 
 
1) If patient was exposed but without symptoms, he/she should self-isolate at home for 14 days from day of exposure. 2) If patient has symptoms concerning for COVID-19, such as fever, cough, shortness of breath, regardless if patient received testing or not, patient should self-isolate at home until 3 days after symptoms have resolved AND 7 days after symptoms first started, whichever is later. 3) The patient has a pending COVID test that should result in the next 3-5 days. Thank you. Sincerely, Drake Sellers

## 2021-04-21 ENCOUNTER — PATIENT OUTREACH (OUTPATIENT)
Dept: CASE MANAGEMENT | Age: 36
End: 2021-04-21

## 2021-04-21 LAB
SARS-COV-2, XPLCVT: DETECTED
SOURCE, COVRS: ABNORMAL

## 2021-04-21 NOTE — PROGRESS NOTES
4/21/2021  5:00 PM    Patient contacted regarding [id number] diagnosis\". Discussed COVID-19 related testing which was available at this time. Test results were positive. Patient informed of results, if available? Unable to reach patient. Ambulatory Care Manager contacted the patient by telephone to notify of COVID-19 test results; unable to reach patient.

## 2021-04-21 NOTE — PROGRESS NOTES
RE COVID: Called patient. Verified demographics. Informed of positive Covid test.  Questions answered.

## 2021-04-21 NOTE — ACP (ADVANCE CARE PLANNING)
Does patient have an Advance Directive: currently not on file; education provided . Patient is not interested in completing an Advance Medical Directive at this time.

## 2021-04-21 NOTE — PROGRESS NOTES
Patient contacted regarding COVID-19 risk and exposure. Discussed COVID-19 related testing which was pending at this time. Test results were pending. Patient informed of results, if available? Result Pending. Ambulatory Care Manager contacted the patient by telephone to perform post discharge assessment. Call within 2 business days of discharge: Yes Verified name and  with patient as identifiers. Provided introduction to self, and explanation of the CTN/ACM role, and reason for call due to risk factors for infection and/or exposure to COVID-19. Patient reports positive COVID-19 exposure on Saturday, 21. Symptoms reviewed with patient who verbalized the following symptoms: chills or shaking, sweating, no new symptoms and no worsening symptoms      Due to no new or worsening symptoms encounter was not routed to provider for escalation. Discussed follow-up appointments. If no appointment was previously scheduled, appointment scheduling offered:  Yes; pt declines. Followed by Dr. Juni Alejandro for primary care [facility] Provider). [facility] follow up appointment(s): No future appointments. Non-[facility] follow up appointment(s): N/A     Advance Care Planning:   Does patient have an Advance Directive: currently not on file; education provided . Patient is not interested in completing an Advance Medical Directive at this time. .     Patient has following risk factors of: asthma. ACM reviewed discharge instructions, medical action plan and red flags such as increased shortness of breath, increasing fever and signs of decompensation with patient who verbalized understanding. Discussed exposure protocols and quarantine with CDC Guidelines What to do if you are sick with coronavirus disease .  Patient was given an opportunity for questions and concerns.  The patient agrees to contact the [facility] exposure line [phone], [facility] department [address]  [phone] and PCP office for questions related to their healthcare. ACM provided contact information for future needs. Reviewed and educated patient on any new and changed medications related to discharge diagnosis; Rx - none. Was patient discharged with a pulse oximeter? no Discussed and confirmed pulse oximeter discharge instructions and when to notify provider or seek emergency care. Plan for follow-up call in 1-2 days based on severity of symptoms and risk factors.

## 2021-04-22 NOTE — PROGRESS NOTES
4/22/2021  12:59 PM    Second patient outreach attempt by this ACM to notify of COVID-19 test results; unable to reach patient.

## 2021-04-27 ENCOUNTER — PATIENT OUTREACH (OUTPATIENT)
Dept: CASE MANAGEMENT | Age: 36
End: 2021-04-27

## 2021-04-27 NOTE — PROGRESS NOTES
Patient outreach attempt by this ACM today to perform COVID Care Transitions follow-up assessment; unable to reach patient.

## 2021-05-05 ENCOUNTER — PATIENT OUTREACH (OUTPATIENT)
Dept: CASE MANAGEMENT | Age: 36
End: 2021-05-05

## 2021-05-05 NOTE — PROGRESS NOTES
5/5/2021  12:26 PM    Patient contacted regarding [id number] diagnosis\". Discussed COVID-19 related testing which was available at this time. Test results were positive. Patient informed of results, if available? Yes    Patient/family has been provided the following resources and education related to COVID-19:                         Signs, symptoms and red flags related to COVID-19            [facility] exposure and quarantine guidelines            [facility] exposure contact - [phone]            Contact for their local Department of Health                 Patient currently reports that the following symptoms have improved: headaches, pain or aching joints and no worsening symptoms. Patient reports new onset of neck pain post-discharge, onset approximately one week ago; pt describes pain as constant, \"sharp\" and rates pain a 9 out of 10 (numerical pain scale). Patient has not contacted his PCP to notify of new symptom onset. ACM offered to assist patient with scheduling of PCP follow-up and also educated patient on [facility] and Home Team services. Patient is interested in scheduling [facility] visit today. AC connected call with [facility] services to assist with pt scheduling; pt has been scheduled for [facility] visit today between 145 PM- 345 PM.    No further outreach scheduled with this CTN/ACM/LPN/HC/ MA. Episode of Care resolved. Patient has this CTN/ACM/LPN/HC/MA contact information if future needs arise.

## 2021-05-12 ENCOUNTER — PATIENT OUTREACH (OUTPATIENT)
Dept: CASE MANAGEMENT | Age: 36
End: 2021-05-12

## 2021-05-12 NOTE — PROGRESS NOTES
5/12/2021  2:25 PM    Patient resolved from Transition of Care episode on 5/12/2021. ACM/CTN was unsuccessful at contacting this patient today. Patient/family was provided the following resources and education related to COVID-19 during the initial call:                         Signs, symptoms and red flags related to COVID-19            CDC exposure and quarantine guidelines            [facility] exposure contact - [phone]            Contact for their local Department of Health                 Patient has not had any additional ED or hospital visits. No further outreach scheduled with this CTN/ACM. Episode of Care resolved. Patient has this CTN/ACM contact information if future needs arise.

## 2022-03-20 PROBLEM — L98.9 SKIN LESION OF LEFT LEG: Status: ACTIVE | Noted: 2018-05-29

## 2022-11-10 ENCOUNTER — HOSPITAL ENCOUNTER (EMERGENCY)
Age: 37
Discharge: HOME | End: 2022-11-10
Attending: EMERGENCY MEDICINE
Payer: MEDICARE

## 2022-11-10 VITALS
RESPIRATION RATE: 18 BRPM | DIASTOLIC BLOOD PRESSURE: 87 MMHG | BODY MASS INDEX: 23.64 KG/M2 | HEART RATE: 62 BPM | SYSTOLIC BLOOD PRESSURE: 124 MMHG | OXYGEN SATURATION: 98 % | WEIGHT: 178.35 LBS | TEMPERATURE: 97.9 F | HEIGHT: 73 IN

## 2022-11-10 DIAGNOSIS — K08.89 PAIN, DENTAL: Primary | ICD-10-CM

## 2022-11-10 PROCEDURE — 74011250637 HC RX REV CODE- 250/637: Performed by: PHYSICIAN ASSISTANT

## 2022-11-10 PROCEDURE — 99283 EMERGENCY DEPT VISIT LOW MDM: CPT

## 2022-11-10 PROCEDURE — 74011000250 HC RX REV CODE- 250: Performed by: PHYSICIAN ASSISTANT

## 2022-11-10 RX ORDER — HYDROCODONE BITARTRATE AND ACETAMINOPHEN 5; 325 MG/1; MG/1
1 TABLET ORAL
Qty: 5 TABLET | Refills: 0 | Status: SHIPPED | OUTPATIENT
Start: 2022-11-10 | End: 2022-11-13

## 2022-11-10 RX ORDER — HYDROCODONE BITARTRATE AND ACETAMINOPHEN 5; 325 MG/1; MG/1
1 TABLET ORAL ONCE
Status: COMPLETED | OUTPATIENT
Start: 2022-11-10 | End: 2022-11-10

## 2022-11-10 RX ORDER — PENICILLIN V POTASSIUM 250 MG/1
500 TABLET, FILM COATED ORAL
Status: COMPLETED | OUTPATIENT
Start: 2022-11-10 | End: 2022-11-10

## 2022-11-10 RX ORDER — BUPIVACAINE HYDROCHLORIDE 5 MG/ML
1 INJECTION, SOLUTION EPIDURAL; INTRACAUDAL
Status: DISCONTINUED | OUTPATIENT
Start: 2022-11-10 | End: 2022-11-10

## 2022-11-10 RX ORDER — NAPROXEN 500 MG/1
500 TABLET ORAL 2 TIMES DAILY WITH MEALS
Qty: 20 TABLET | Refills: 0 | Status: SHIPPED | OUTPATIENT
Start: 2022-11-10 | End: 2022-11-20

## 2022-11-10 RX ORDER — PENICILLIN V POTASSIUM 500 MG/1
500 TABLET, FILM COATED ORAL 4 TIMES DAILY
Qty: 28 TABLET | Refills: 0 | Status: SHIPPED | OUTPATIENT
Start: 2022-11-10 | End: 2022-11-17

## 2022-11-10 RX ORDER — CHLORHEXIDINE GLUCONATE 1.2 MG/ML
15 RINSE ORAL EVERY 12 HOURS
Qty: 420 ML | Refills: 0 | Status: SHIPPED | OUTPATIENT
Start: 2022-11-10 | End: 2022-11-24

## 2022-11-10 RX ADMIN — HYDROCODONE BITARTRATE AND ACETAMINOPHEN 1 TABLET: 5; 325 TABLET ORAL at 13:45

## 2022-11-10 RX ADMIN — BENZOCAINE, BUTAMBEN, AND TETRACAINE HYDROCHLORIDE: .028; .004; .004 AEROSOL, SPRAY TOPICAL at 13:46

## 2022-11-10 RX ADMIN — PENICILLIN V POTASSIUM 500 MG: 250 TABLET, FILM COATED ORAL at 14:02

## 2022-11-10 NOTE — ED PROVIDER NOTES
EMERGENCY DEPARTMENT HISTORY AND PHYSICAL EXAM      Date: 11/10/2022  Patient Name: Duyen Regalado    History of Presenting Illness     Chief Complaint   Patient presents with    Dental Pain    Facial Pain     Right sided facial pain starting months ago. Hx of abcess that req. Surgical I&D aprox 1 year ago. Has tried OTC pain medications, salt water gargle, oragel with minimal improvement. History Provided By: Patient    HPI: Duyen Regalado, 40 y.o. male with PMHx of ADHD, bipolar disorder, and additional history as noted below, per patient and record review, presents to the ED for evaluation of mild to moderate, constant, mid upper dental pain. States this is somewhat of an acute on chronic issue for him, but states this particular episode has been bothering him for the past few days. States the pain is a constant, throbbing pain, intermittently radiates to the face and jaw. Pain is worse with chewing, although denies difficulty swallowing. Tolerating solids and liquids well. Minimal relief with oragel. States he has had dental issues in the past and had issues when he had a prior tooth removed 2 years ago that required drain placement, but has not had any difficulty since that time. States he is working on finding an oral surgeon. Denies trauma. Denies fevers or chills,  headache, eye pain, changes in vision, ear pain, changes in hearing, neck pain, difficulty speaking, changes in voice, facial rashes, difficulty opening mouth. Denies any chest pain, shortness of breath, abdominal pain, nausea, vomiting, diarrhea. No additional exacerbating or alleviating factors. No other complaints at this time. There are no other complaints, changes, or physical findings at this time. PCP: Stephen Hartman MD    Current Outpatient Medications   Medication Sig Dispense Refill    penicillin v potassium (VEETID) 500 mg tablet Take 1 Tablet by mouth four (4) times daily for 7 days.  28 Tablet 0 chlorhexidine (Peridex) 0.12 % solution 15 mL by Swish and Spit route every twelve (12) hours for 14 days. 420 mL 0    naproxen (Naprosyn) 500 mg tablet Take 1 Tablet by mouth two (2) times daily (with meals) for 10 days. 20 Tablet 0    HYDROcodone-acetaminophen (NORCO) 5-325 mg per tablet Take 1 Tablet by mouth every six (6) hours as needed for Pain for up to 3 days. Max Daily Amount: 4 Tablets. 5 Tablet 0    inhalational spacing device 1 Each by Does Not Apply route as needed for Wheezing. 1 Each 1    albuterol (PROVENTIL HFA, VENTOLIN HFA, PROAIR HFA) 90 mcg/actuation inhaler Take 2 Puffs by inhalation every four (4) hours as needed for Wheezing. Use with spacer. 1 Each 0    albuterol (PROVENTIL VENTOLIN) 2.5 mg /3 mL (0.083 %) nebu 3 mL by Nebulization route every four (4) hours as needed for Wheezing. 25 Each 0    cyclobenzaprine (FLEXERIL) 5 mg tablet Take 1 Tab by mouth three (3) times daily as needed for Muscle Spasm(s). 15 Tab 0    Wixela Inhub 250-50 mcg/dose diskus inhaler INHALE 1 PUFF BY MOUTH TWICE DAILY 60 Each 5    montelukast (SINGULAIR) 10 mg tablet TAKE 1 TABLET BY MOUTH EVERY DAY 30 Tab 5    varenicline (CHANTIX STARTER LESLI) 0.5 mg (11)- 1 mg (42) DsPk Per dose pack instructions. 1 Dose Pack 0    varenicline (CHANTIX) 1 mg tablet Take 1 Tab by mouth two (2) times a day. 60 Tab 4    divalproex DR (DEPAKOTE) 250 mg tablet Take 1 Tab by mouth three (3) times daily. 90 Tab 1    hydrOXYzine HCl (ATARAX) 25 mg tablet Take 1 Tab by mouth three (3) times daily as needed for Anxiety.  80 Tab 1     Past History     Past Medical History:  Past Medical History:   Diagnosis Date    ADHD (attention deficit hyperactivity disorder) 12/7/2009    ADHD (attention deficit hyperactivity disorder)     Allergic rhinitis     Asthma 12/7/2009    Bipolar 2 disorder (HCC)     ED (erectile dysfunction)     Fracture, foot 08/20/2018    Right Foot    HSV (herpes simplex virus) infection     Psychiatric disorder     ADHD, anxiety, depression, mood swings       Past Surgical History:  Past Surgical History:   Procedure Laterality Date    HX HEENT      Bottom molar removed at [facility] on 4/13/2012       Family History:  Family History   Problem Relation Age of Onset    Asthma Mother        Social History:  Social History     Tobacco Use    Smoking status: Every Day     Packs/day: 0.40     Years: 15.00     Pack years: 6.00     Types: Cigars, Cigarettes    Smokeless tobacco: Never   Substance Use Topics    Alcohol use: Yes     Alcohol/week: 0.0 standard drinks     Comment: rare     Drug use: Not Currently       Allergies:  No Known Allergies  Review of Systems   Review of Systems   Constitutional:  Negative for appetite change, chills and fever. HENT:  Positive for dental problem. Negative for congestion, drooling, sore throat and trouble swallowing. Eyes:  Negative for pain. Respiratory:  Negative for cough and shortness of breath. Cardiovascular:  Negative for chest pain. Gastrointestinal:  Negative for abdominal pain, constipation, diarrhea, nausea and vomiting. Genitourinary:  Negative for difficulty urinating, dysuria and frequency. Skin:  Negative for rash. Neurological:  Negative for syncope and headaches. All other systems reviewed and are negative. Physical Exam   Physical Exam  Vitals and nursing note reviewed. Constitutional:       General: He is not in acute distress. Appearance: Normal appearance. He is not ill-appearing or toxic-appearing. Comments: 40 y.o. male   HENT:      Head: Normocephalic and atraumatic. Right Ear: External ear normal.      Left Ear: External ear normal.      Nose: Nose normal.      Mouth/Throat:      Mouth: Mucous membranes are moist.      Pharynx: Oropharynx is clear. No oropharyngeal exudate or posterior oropharyngeal erythema.         Comments:   TTP to right upper dentition with associated dental caries and deformity, no pulp exposure, + mild surrounding soft tissue swelling. No fluctuance or appreciable abscess. Oropharynx widely patent. Tolerating secretions well. No Derick's angina. No trismus. No facial swelling or rashes. Clear and coherent speech. Eyes:      Extraocular Movements: Extraocular movements intact. Conjunctiva/sclera: Conjunctivae normal.   Cardiovascular:      Rate and Rhythm: Normal rate and regular rhythm. Pulses: Normal pulses. Heart sounds: Normal heart sounds. No murmur heard. Pulmonary:      Effort: Pulmonary effort is normal. No respiratory distress. Breath sounds: Normal breath sounds. No wheezing. Abdominal:      General: Abdomen is flat. There is no distension. Musculoskeletal:         General: Normal range of motion. Cervical back: Normal range of motion. Skin:     General: Skin is warm and dry. Neurological:      General: No focal deficit present. Mental Status: He is alert and oriented to person, place, and time. Psychiatric:         Mood and Affect: Mood normal.         Behavior: Behavior normal.     Diagnostic Study Results     Labs -   No results found for this or any previous visit (from the past 12 hour(s)). Radiologic Studies -   No orders to display     CT Results  (Last 48 hours)      None          CXR Results  (Last 48 hours)      None          Medical Decision Making   I am the first provider for this patient. I reviewed the vital signs, available nursing notes, past medical history, past surgical history, family history and social history. Vital Signs-Reviewed the patient's vital signs. Patient Vitals for the past 12 hrs:   Temp Pulse Resp BP SpO2   11/10/22 1300 97.9 °F (36.6 °C) 62 18 124/87 98 %         Records Reviewed: Nursing Notes and Old Medical Records    Provider Notes (Medical Decision Making):   Patient presents with dental pain as noted above. Afebrile, nontoxic-appearing. No suggestion of infectious, airway, or ENT immediately life-threatening medical condition.   No suggestion of epiglottitis, Derick's angina, or other obstructive or emergent conditions requiring further evaluation/management acutely here at this time. No appreciable abscess to drain at this time. Will place on antibiotics and provide pain control. Shared decision making performed and care plan created together, discussed diagnosis and treatment plan. Counseled additional symptomatic management techniques. Dental follow-up as soon as possible. Verbal return precautions discussed at length. Patient verbalizes understanding and agreement of current plan of care. Offered dental block, patient declined. ED Course:   Initial assessment performed. The patients presenting problems have been discussed, and they are in agreement with the care plan formulated and outlined with them. I have encouraged them to ask questions as they arise throughout their visit. Disposition:  Discharge     PLAN:  1. Discharge Medication List as of 11/10/2022  2:24 PM        START taking these medications    Details   penicillin v potassium (VEETID) 500 mg tablet Take 1 Tablet by mouth four (4) times daily for 7 days. , Normal, Disp-28 Tablet, R-0      chlorhexidine (Peridex) 0.12 % solution 15 mL by Swish and Spit route every twelve (12) hours for 14 days. , Normal, Disp-420 mL, R-0      naproxen (Naprosyn) 500 mg tablet Take 1 Tablet by mouth two (2) times daily (with meals) for 10 days. , Normal, Disp-20 Tablet, R-0      HYDROcodone-acetaminophen (NORCO) 5-325 mg per tablet Take 1 Tablet by mouth every six (6) hours as needed for Pain for up to 3 days.  Max Daily Amount: 4 Tablets., Normal, Disp-5 Tablet, R-0Attending physician Dr Yessenia Gould these medications which have NOT CHANGED    Details   inhalational spacing device 1 Each by Does Not Apply route as needed for Wheezing., Normal, Disp-1 Each, R-1      albuterol (PROVENTIL HFA, VENTOLIN HFA, PROAIR HFA) 90 mcg/actuation inhaler Take 2 Puffs by inhalation every four (4) hours as needed for Wheezing. Use with spacer., Normal, Disp-1 Each, R-0      albuterol (PROVENTIL VENTOLIN) 2.5 mg /3 mL (0.083 %) nebu 3 mL by Nebulization route every four (4) hours as needed for Wheezing., Normal, Disp-25 Each, R-0      cyclobenzaprine (FLEXERIL) 5 mg tablet Take 1 Tab by mouth three (3) times daily as needed for Muscle Spasm(s). , Print, Disp-15 Tab, R-0      Wixela Inhub 250-50 mcg/dose diskus inhaler INHALE 1 PUFF BY MOUTH TWICE DAILY, Normal, Disp-60 Each, R-5      montelukast (SINGULAIR) 10 mg tablet TAKE 1 TABLET BY MOUTH EVERY DAY, Normal, Disp-30 Tab, R-5      varenicline (CHANTIX STARTER LESLI) 0.5 mg (11)- 1 mg (42) DsPk Per dose pack instructions. , Normal, Disp-1 Dose Pack,R-0      varenicline (CHANTIX) 1 mg tablet Take 1 Tab by mouth two (2) times a day., Normal, Disp-60 Tab,R-4      divalproex DR (DEPAKOTE) 250 mg tablet Take 1 Tab by mouth three (3) times daily. , Normal, Disp-90 Tab, R-1      hydrOXYzine HCl (ATARAX) 25 mg tablet Take 1 Tab by mouth three (3) times daily as needed for Anxiety., Normal, Disp-90 Tab, R-1           2. Follow-up Information       Follow up With Specialties Details Why Contact Info    [facility] EMERGENCY DEPT Emergency Medicine  As needed, If symptoms worsen [address] [place] [address]  [phone]    [facility] [address] OFFICE  In 1 week  [address] [id number] [address]  In 3 days  [address] [place]  [phone]          Return to ED if worse     Diagnosis     Clinical Impression:   1.  Pain, dental

## 2022-11-10 NOTE — DISCHARGE INSTRUCTIONS
Thank You! It was a pleasure taking care of you in our Emergency Department today. We know that when you come to [address] [place] [address], you are entrusting us with your health, comfort, and safety. Our clinicians honor that trust, and truly appreciate the opportunity to care for you and your loved ones. We also value your feedback. If you receive a survey about your Emergency Department experience today, please fill it out. We care about our patients' feedback, and we listen to what you have to say. Thank you.     Alex Adam PA-C